# Patient Record
Sex: FEMALE | Race: WHITE | HISPANIC OR LATINO | Employment: OTHER | URBAN - METROPOLITAN AREA
[De-identification: names, ages, dates, MRNs, and addresses within clinical notes are randomized per-mention and may not be internally consistent; named-entity substitution may affect disease eponyms.]

---

## 2023-12-16 ENCOUNTER — APPOINTMENT (OUTPATIENT)
Dept: MRI IMAGING | Facility: CLINIC | Age: 78
End: 2023-12-16
Attending: STUDENT IN AN ORGANIZED HEALTH CARE EDUCATION/TRAINING PROGRAM
Payer: MEDICARE

## 2023-12-16 ENCOUNTER — HOSPITAL ENCOUNTER (OUTPATIENT)
Facility: CLINIC | Age: 78
Setting detail: OBSERVATION
Discharge: HOME | End: 2023-12-17
Attending: STUDENT IN AN ORGANIZED HEALTH CARE EDUCATION/TRAINING PROGRAM | Admitting: HOSPITALIST
Payer: MEDICARE

## 2023-12-16 ENCOUNTER — RESULTS ONLY (OUTPATIENT)
Dept: ADMINISTRATIVE | Facility: CLINIC | Age: 78
End: 2023-12-16

## 2023-12-16 DIAGNOSIS — Z92.89 HISTORY OF CARDIAC MONITORING: Primary | ICD-10-CM

## 2023-12-16 DIAGNOSIS — G45.9 TIA (TRANSIENT ISCHEMIC ATTACK): ICD-10-CM

## 2023-12-16 PROBLEM — I07.1 MILD TRICUSPID REGURGITATION: Status: ACTIVE | Noted: 2020-06-30

## 2023-12-16 PROBLEM — I65.21 CAROTID ARTERY PLAQUE, RIGHT: Status: ACTIVE | Noted: 2020-06-30

## 2023-12-16 LAB
ALBUMIN SERPL BCG-MCNC: 3.9 G/DL (ref 3.5–5.2)
ALP SERPL-CCNC: 79 U/L (ref 40–150)
ALT SERPL W P-5'-P-CCNC: 19 U/L (ref 0–50)
ANION GAP SERPL CALCULATED.3IONS-SCNC: 7 MMOL/L (ref 7–15)
AST SERPL W P-5'-P-CCNC: 24 U/L (ref 0–45)
ATRIAL RATE - MUSE: 52 BPM
ATRIAL RATE - MUSE: 57 BPM
BASOPHILS # BLD AUTO: 0 10E3/UL (ref 0–0.2)
BASOPHILS NFR BLD AUTO: 1 %
BILIRUB SERPL-MCNC: 0.5 MG/DL
BUN SERPL-MCNC: 23.6 MG/DL (ref 8–23)
CALCIUM SERPL-MCNC: 9.1 MG/DL (ref 8.8–10.2)
CHLORIDE SERPL-SCNC: 105 MMOL/L (ref 98–107)
CHOLEST SERPL-MCNC: 157 MG/DL
CREAT SERPL-MCNC: 0.91 MG/DL (ref 0.51–0.95)
DEPRECATED HCO3 PLAS-SCNC: 28 MMOL/L (ref 22–29)
DIASTOLIC BLOOD PRESSURE - MUSE: NORMAL MMHG
DIASTOLIC BLOOD PRESSURE - MUSE: NORMAL MMHG
EGFRCR SERPLBLD CKD-EPI 2021: 63 ML/MIN/1.73M2
EOSINOPHIL # BLD AUTO: 0.3 10E3/UL (ref 0–0.7)
EOSINOPHIL NFR BLD AUTO: 5 %
ERYTHROCYTE [DISTWIDTH] IN BLOOD BY AUTOMATED COUNT: 12.4 % (ref 10–15)
GLUCOSE BLDC GLUCOMTR-MCNC: 79 MG/DL (ref 70–99)
GLUCOSE BLDC GLUCOMTR-MCNC: 95 MG/DL (ref 70–99)
GLUCOSE SERPL-MCNC: 94 MG/DL (ref 70–99)
HBA1C MFR BLD: 5.8 %
HCT VFR BLD AUTO: 39.5 % (ref 35–47)
HDLC SERPL-MCNC: 67 MG/DL
HGB BLD-MCNC: 13.2 G/DL (ref 11.7–15.7)
IMM GRANULOCYTES # BLD: 0 10E3/UL
IMM GRANULOCYTES NFR BLD: 0 %
INR PPP: 1.03 (ref 0.85–1.15)
INTERPRETATION ECG - MUSE: NORMAL
INTERPRETATION ECG - MUSE: NORMAL
LDLC SERPL CALC-MCNC: 79 MG/DL
LYMPHOCYTES # BLD AUTO: 1.3 10E3/UL (ref 0.8–5.3)
LYMPHOCYTES NFR BLD AUTO: 25 %
MCH RBC QN AUTO: 31.8 PG (ref 26.5–33)
MCHC RBC AUTO-ENTMCNC: 33.4 G/DL (ref 31.5–36.5)
MCV RBC AUTO: 95 FL (ref 78–100)
MONOCYTES # BLD AUTO: 0.6 10E3/UL (ref 0–1.3)
MONOCYTES NFR BLD AUTO: 11 %
NEUTROPHILS # BLD AUTO: 3 10E3/UL (ref 1.6–8.3)
NEUTROPHILS NFR BLD AUTO: 58 %
NONHDLC SERPL-MCNC: 90 MG/DL
NRBC # BLD AUTO: 0 10E3/UL
NRBC BLD AUTO-RTO: 0 /100
P AXIS - MUSE: 83 DEGREES
P AXIS - MUSE: 88 DEGREES
PLATELET # BLD AUTO: 141 10E3/UL (ref 150–450)
POTASSIUM SERPL-SCNC: 4.1 MMOL/L (ref 3.4–5.3)
PR INTERVAL - MUSE: 172 MS
PR INTERVAL - MUSE: 174 MS
PROT SERPL-MCNC: 6.9 G/DL (ref 6.4–8.3)
QRS DURATION - MUSE: 74 MS
QRS DURATION - MUSE: 80 MS
QT - MUSE: 474 MS
QT - MUSE: 474 MS
QTC - MUSE: 440 MS
QTC - MUSE: 461 MS
R AXIS - MUSE: -30 DEGREES
R AXIS - MUSE: -33 DEGREES
RBC # BLD AUTO: 4.15 10E6/UL (ref 3.8–5.2)
SODIUM SERPL-SCNC: 140 MMOL/L (ref 135–145)
SYSTOLIC BLOOD PRESSURE - MUSE: NORMAL MMHG
SYSTOLIC BLOOD PRESSURE - MUSE: NORMAL MMHG
T AXIS - MUSE: 26 DEGREES
T AXIS - MUSE: 38 DEGREES
TRIGL SERPL-MCNC: 56 MG/DL
TROPONIN T SERPL HS-MCNC: 12 NG/L
VENTRICULAR RATE- MUSE: 52 BPM
VENTRICULAR RATE- MUSE: 57 BPM
WBC # BLD AUTO: 5.2 10E3/UL (ref 4–11)

## 2023-12-16 PROCEDURE — 70553 MRI BRAIN STEM W/O & W/DYE: CPT

## 2023-12-16 PROCEDURE — 96361 HYDRATE IV INFUSION ADD-ON: CPT

## 2023-12-16 PROCEDURE — 70544 MR ANGIOGRAPHY HEAD W/O DYE: CPT | Mod: XU

## 2023-12-16 PROCEDURE — 120N000001 HC R&B MED SURG/OB

## 2023-12-16 PROCEDURE — 250N000013 HC RX MED GY IP 250 OP 250 PS 637

## 2023-12-16 PROCEDURE — 99222 1ST HOSP IP/OBS MODERATE 55: CPT | Mod: AI | Performed by: INTERNAL MEDICINE

## 2023-12-16 PROCEDURE — 250N000011 HC RX IP 250 OP 636: Performed by: INTERNAL MEDICINE

## 2023-12-16 PROCEDURE — 83036 HEMOGLOBIN GLYCOSYLATED A1C: CPT | Performed by: INTERNAL MEDICINE

## 2023-12-16 PROCEDURE — 96360 HYDRATION IV INFUSION INIT: CPT | Mod: XU

## 2023-12-16 PROCEDURE — 255N000002 HC RX 255 OP 636: Performed by: STUDENT IN AN ORGANIZED HEALTH CARE EDUCATION/TRAINING PROGRAM

## 2023-12-16 PROCEDURE — A9585 GADOBUTROL INJECTION: HCPCS | Performed by: STUDENT IN AN ORGANIZED HEALTH CARE EDUCATION/TRAINING PROGRAM

## 2023-12-16 PROCEDURE — 250N000013 HC RX MED GY IP 250 OP 250 PS 637: Performed by: INTERNAL MEDICINE

## 2023-12-16 PROCEDURE — 85610 PROTHROMBIN TIME: CPT | Performed by: STUDENT IN AN ORGANIZED HEALTH CARE EDUCATION/TRAINING PROGRAM

## 2023-12-16 PROCEDURE — 70549 MR ANGIOGRAPH NECK W/O&W/DYE: CPT

## 2023-12-16 PROCEDURE — 80061 LIPID PANEL: CPT | Performed by: INTERNAL MEDICINE

## 2023-12-16 PROCEDURE — 85025 COMPLETE CBC W/AUTO DIFF WBC: CPT | Performed by: STUDENT IN AN ORGANIZED HEALTH CARE EDUCATION/TRAINING PROGRAM

## 2023-12-16 PROCEDURE — 84484 ASSAY OF TROPONIN QUANT: CPT | Performed by: STUDENT IN AN ORGANIZED HEALTH CARE EDUCATION/TRAINING PROGRAM

## 2023-12-16 PROCEDURE — 93005 ELECTROCARDIOGRAM TRACING: CPT

## 2023-12-16 PROCEDURE — 250N000013 HC RX MED GY IP 250 OP 250 PS 637: Performed by: STUDENT IN AN ORGANIZED HEALTH CARE EDUCATION/TRAINING PROGRAM

## 2023-12-16 PROCEDURE — 82040 ASSAY OF SERUM ALBUMIN: CPT | Performed by: STUDENT IN AN ORGANIZED HEALTH CARE EDUCATION/TRAINING PROGRAM

## 2023-12-16 PROCEDURE — 99285 EMERGENCY DEPT VISIT HI MDM: CPT | Mod: 25

## 2023-12-16 PROCEDURE — 36415 COLL VENOUS BLD VENIPUNCTURE: CPT | Performed by: STUDENT IN AN ORGANIZED HEALTH CARE EDUCATION/TRAINING PROGRAM

## 2023-12-16 RX ORDER — ONDANSETRON 4 MG/1
4 TABLET, ORALLY DISINTEGRATING ORAL EVERY 6 HOURS PRN
Status: DISCONTINUED | OUTPATIENT
Start: 2023-12-16 | End: 2023-12-17 | Stop reason: HOSPADM

## 2023-12-16 RX ORDER — LEVOTHYROXINE SODIUM 75 UG/1
75 TABLET ORAL
Status: DISCONTINUED | OUTPATIENT
Start: 2023-12-17 | End: 2023-12-17 | Stop reason: HOSPADM

## 2023-12-16 RX ORDER — ASPIRIN 325 MG
325 TABLET, DELAYED RELEASE (ENTERIC COATED) ORAL DAILY
Status: DISCONTINUED | OUTPATIENT
Start: 2023-12-17 | End: 2023-12-17

## 2023-12-16 RX ORDER — ATORVASTATIN CALCIUM 40 MG/1
40 TABLET, FILM COATED ORAL
Status: DISCONTINUED | OUTPATIENT
Start: 2023-12-16 | End: 2023-12-17 | Stop reason: HOSPADM

## 2023-12-16 RX ORDER — SODIUM CHLORIDE AND POTASSIUM CHLORIDE 150; 900 MG/100ML; MG/100ML
INJECTION, SOLUTION INTRAVENOUS CONTINUOUS
Status: ACTIVE | OUTPATIENT
Start: 2023-12-16 | End: 2023-12-16

## 2023-12-16 RX ORDER — LEVOTHYROXINE SODIUM 75 UG/1
75 TABLET ORAL DAILY
COMMUNITY
Start: 2022-12-15

## 2023-12-16 RX ORDER — PROCHLORPERAZINE MALEATE 5 MG
5 TABLET ORAL EVERY 6 HOURS PRN
Status: DISCONTINUED | OUTPATIENT
Start: 2023-12-16 | End: 2023-12-17 | Stop reason: HOSPADM

## 2023-12-16 RX ORDER — ATORVASTATIN CALCIUM 10 MG/1
5 TABLET, FILM COATED ORAL EVERY EVENING
Status: ON HOLD | COMMUNITY
Start: 2022-12-15 | End: 2023-12-17

## 2023-12-16 RX ORDER — CHOLECALCIFEROL (VITAMIN D3) 50 MCG
1 TABLET ORAL DAILY
Status: ON HOLD | COMMUNITY
End: 2023-12-16

## 2023-12-16 RX ORDER — VITAMIN B COMPLEX
2 TABLET ORAL DAILY
COMMUNITY

## 2023-12-16 RX ORDER — ZINC GLUCONATE 50 MG
50 TABLET ORAL DAILY
COMMUNITY

## 2023-12-16 RX ORDER — ASPIRIN 81 MG/1
81 TABLET ORAL
Status: ON HOLD | COMMUNITY
End: 2023-12-17

## 2023-12-16 RX ORDER — ASPIRIN 300 MG/1
300 SUPPOSITORY RECTAL DAILY
Status: DISCONTINUED | OUTPATIENT
Start: 2023-12-17 | End: 2023-12-17

## 2023-12-16 RX ORDER — HYDRALAZINE HYDROCHLORIDE 20 MG/ML
10-20 INJECTION INTRAMUSCULAR; INTRAVENOUS
Status: DISCONTINUED | OUTPATIENT
Start: 2023-12-16 | End: 2023-12-17 | Stop reason: HOSPADM

## 2023-12-16 RX ORDER — AMOXICILLIN 500 MG
1200 CAPSULE ORAL DAILY
COMMUNITY

## 2023-12-16 RX ORDER — ASPIRIN 325 MG
325 TABLET ORAL ONCE
Status: COMPLETED | OUTPATIENT
Start: 2023-12-16 | End: 2023-12-16

## 2023-12-16 RX ORDER — PROCHLORPERAZINE 25 MG
12.5 SUPPOSITORY, RECTAL RECTAL EVERY 12 HOURS PRN
Status: DISCONTINUED | OUTPATIENT
Start: 2023-12-16 | End: 2023-12-17 | Stop reason: HOSPADM

## 2023-12-16 RX ORDER — GADOBUTROL 604.72 MG/ML
10 INJECTION INTRAVENOUS ONCE
Status: COMPLETED | OUTPATIENT
Start: 2023-12-16 | End: 2023-12-16

## 2023-12-16 RX ORDER — CLOPIDOGREL BISULFATE 75 MG/1
300 TABLET ORAL ONCE
Status: COMPLETED | OUTPATIENT
Start: 2023-12-16 | End: 2023-12-16

## 2023-12-16 RX ORDER — ASPIRIN 81 MG/1
324 TABLET, CHEWABLE ORAL DAILY
Status: DISCONTINUED | OUTPATIENT
Start: 2023-12-17 | End: 2023-12-17

## 2023-12-16 RX ORDER — ONDANSETRON 2 MG/ML
4 INJECTION INTRAMUSCULAR; INTRAVENOUS EVERY 6 HOURS PRN
Status: DISCONTINUED | OUTPATIENT
Start: 2023-12-16 | End: 2023-12-17 | Stop reason: HOSPADM

## 2023-12-16 RX ORDER — LABETALOL HYDROCHLORIDE 5 MG/ML
10-40 INJECTION, SOLUTION INTRAVENOUS EVERY 10 MIN PRN
Status: DISCONTINUED | OUTPATIENT
Start: 2023-12-16 | End: 2023-12-17 | Stop reason: HOSPADM

## 2023-12-16 RX ORDER — LOSARTAN POTASSIUM 50 MG/1
1 TABLET ORAL DAILY
COMMUNITY
Start: 2023-11-28

## 2023-12-16 RX ORDER — DIAZEPAM 5 MG
5 TABLET ORAL ONCE
Status: COMPLETED | OUTPATIENT
Start: 2023-12-16 | End: 2023-12-16

## 2023-12-16 RX ADMIN — ASPIRIN 325 MG ORAL TABLET 325 MG: 325 PILL ORAL at 15:04

## 2023-12-16 RX ADMIN — CLOPIDOGREL BISULFATE 300 MG: 75 TABLET ORAL at 16:03

## 2023-12-16 RX ADMIN — POTASSIUM CHLORIDE AND SODIUM CHLORIDE: 900; 150 INJECTION, SOLUTION INTRAVENOUS at 15:56

## 2023-12-16 RX ADMIN — GADOBUTROL 10 ML: 604.72 INJECTION INTRAVENOUS at 14:03

## 2023-12-16 RX ADMIN — DIAZEPAM 5 MG: 5 TABLET ORAL at 12:45

## 2023-12-16 RX ADMIN — ATORVASTATIN CALCIUM 40 MG: 40 TABLET, FILM COATED ORAL at 20:44

## 2023-12-16 ASSESSMENT — ACTIVITIES OF DAILY LIVING (ADL)
ADLS_ACUITY_SCORE: 35
CHANGE_IN_FUNCTIONAL_STATUS_SINCE_ONSET_OF_CURRENT_ILLNESS/INJURY: NO
DIFFICULTY_EATING/SWALLOWING: NO
EQUIPMENT_CURRENTLY_USED_AT_HOME: GRAB BAR, TUB/SHOWER;RAISED TOILET SEAT
WEAR_GLASSES_OR_BLIND: YES
DRESSING/BATHING_DIFFICULTY: NO
FALL_HISTORY_WITHIN_LAST_SIX_MONTHS: NO
CONCENTRATING,_REMEMBERING_OR_MAKING_DECISIONS_DIFFICULTY: NO
ADLS_ACUITY_SCORE: 20
HEARING_DIFFICULTY_OR_DEAF: NO
ADLS_ACUITY_SCORE: 20
VISION_MANAGEMENT: GLASSES
DOING_ERRANDS_INDEPENDENTLY_DIFFICULTY: NO
TOILETING_ISSUES: NO
DIFFICULTY_COMMUNICATING: NO
ADLS_ACUITY_SCORE: 20
WALKING_OR_CLIMBING_STAIRS_DIFFICULTY: NO

## 2023-12-16 NOTE — ED PROVIDER NOTES
"  History     Chief Complaint:  Stroke Symptoms       HPI   Belinda Castillo is a 78 year old female with a history of hypertension and hypothyroidism who presents with difficulty speaking which occurred at 1000 today which lasted 15 minutes. Her speech was slurred and her  noted a facial droop around right lip. Denies fever, chest pain, shortness of breath, vision changes, or headache. She is on statin for high blood pressure and daily baby aspirin. Denies history of diabetes, smoking, or TIA. She reports being claustrophobic and would like something to calm down during MRI.     Independent Historian:    None - Patient Only    Review of External Notes:  Blood pressure office visit on 5/16     Allergies:  Sulfa Antibiotics     Physical Exam   Patient Vitals for the past 24 hrs:   BP Temp Temp src Pulse Resp SpO2 Height Weight   12/16/23 1419 (!) 145/127 -- -- -- -- 100 % -- --   12/16/23 1234 (!) 142/62 -- -- -- -- -- -- --   12/16/23 1219 (!) 145/108 -- -- 53 -- 99 % -- --   12/16/23 1155 (!) 158/55 97.8  F (36.6  C) Temporal 52 18 97 % 1.727 m (5' 8\") 80.7 kg (178 lb)     Physical Exam  GENERAL: Patient well-appearing  HEAD: Atraumatic.  EYES: Anicteric. Right eyebrow droops which is normal for her.   NOSE: No active bleeding  MOUTH: Moist mucosa  THROAT: Patent airway.   NECK: No rigidity  CV: RRR, no murmurs rubs or gallops  PULM: CTAB with good aeration; no retractions, rales, rhonchi, or wheezing  ABD: Soft, nontender, nondistended, no guarding, no peritoneal signs   DERM: No rash. Skin warm and dry  EXTREMITY: Moving all extremities without difficulty. No calf tenderness or peripheral edema  VASCULAR: Symmetric pulses bilaterally  NEURO: A,Ox3. CN 2-12 fully intact. Strength 5/5 bilateral LE/UE. Sensation fully intact to light touch symmetrically bilateral LE/UE. Normal finger-to-nose and heel to shin. No ataxia.     Emergency Department Course   ECG  ECG results from 12/16/23   EKG 12-lead, tracing " only     Value    Systolic Blood Pressure     Diastolic Blood Pressure     Ventricular Rate 57    Atrial Rate 57    ME Interval 174    QRS Duration 74        QTc 461    P Axis 88    R AXIS -30    T Axis 38    Interpretation ECG      Sinus bradycardia with occasional Premature ventricular complexes  Left axis deviation  Abnormal ECG  No previous ECGs available  Taken 1229, Read 1241     Laboratory: Imaging:   Labs Ordered and Resulted from Time of ED Arrival to Time of ED Departure   COMPREHENSIVE METABOLIC PANEL - Abnormal       Result Value    Sodium 140      Potassium 4.1      Carbon Dioxide (CO2) 28      Anion Gap 7      Urea Nitrogen 23.6 (*)     Creatinine 0.91      GFR Estimate 63      Calcium 9.1      Chloride 105      Glucose 94      Alkaline Phosphatase 79      AST 24      ALT 19      Protein Total 6.9      Albumin 3.9      Bilirubin Total 0.5     CBC WITH PLATELETS AND DIFFERENTIAL - Abnormal    WBC Count 5.2      RBC Count 4.15      Hemoglobin 13.2      Hematocrit 39.5      MCV 95      MCH 31.8      MCHC 33.4      RDW 12.4      Platelet Count 141 (*)     % Neutrophils 58      % Lymphocytes 25      % Monocytes 11      % Eosinophils 5      % Basophils 1      % Immature Granulocytes 0      NRBCs per 100 WBC 0      Absolute Neutrophils 3.0      Absolute Lymphocytes 1.3      Absolute Monocytes 0.6      Absolute Eosinophils 0.3      Absolute Basophils 0.0      Absolute Immature Granulocytes 0.0      Absolute NRBCs 0.0     INR - Normal    INR 1.03     TROPONIN T, HIGH SENSITIVITY - Normal    Troponin T, High Sensitivity 12       MRA Brain ([place] [facility] [place]) wo Contrast   Final Result   IMPRESSION:   HEAD MRI:    1.  No acute or subacute ischemic change.   2.  No acute intracranial process.   3.  Generalized brain atrophy and presumed microvascular ischemic changes as detailed above.      HEAD MRA:    1.  No aneurysm, high flow AVM or significant stenosis identified.      NECK MRA:   1.  No hemodynamically  significant stenosis in the vessels of the neck.      MR Brain w/o & w Contrast   Final Result   IMPRESSION:   HEAD MRI:    1.  No acute or subacute ischemic change.   2.  No acute intracranial process.   3.  Generalized brain atrophy and presumed microvascular ischemic changes as detailed above.      HEAD MRA:    1.  No aneurysm, high flow AVM or significant stenosis identified.      NECK MRA:   1.  No hemodynamically significant stenosis in the vessels of the neck.      MRA Neck (Carotids) wo & w Contrast   Final Result   IMPRESSION:   HEAD MRI:    1.  No acute or subacute ischemic change.   2.  No acute intracranial process.   3.  Generalized brain atrophy and presumed microvascular ischemic changes as detailed above.      HEAD MRA:    1.  No aneurysm, high flow AVM or significant stenosis identified.      NECK MRA:   1.  No hemodynamically significant stenosis in the vessels of the neck.      Echocardiogram Complete    (Results Pending)           Emergency Department Course & Assessments:    Interventions:  Medications   levothyroxine (SYNTHROID/LEVOTHROID) tablet 75 mcg (has no administration in time range)   0.9% sodium chloride + KCl 20 mEq/L infusion (has no administration in time range)   aspirin (ASA) EC tablet 325 mg (has no administration in time range)     Or   aspirin (ASA) chewable tablet 324 mg (has no administration in time range)     Or   aspirin (ASA) Suppository 300 mg (has no administration in time range)   labetalol (NORMODYNE/TRANDATE) injection 10-40 mg (has no administration in time range)   hydrALAZINE (APRESOLINE) injection 10-20 mg (has no administration in time range)   atorvastatin (LIPITOR) tablet 40 mg (has no administration in time range)   ondansetron (ZOFRAN ODT) ODT tab 4 mg (has no administration in time range)     Or   ondansetron (ZOFRAN) injection 4 mg (has no administration in time range)   prochlorperazine (COMPAZINE) injection 5 mg (has no administration in time range)      Or   prochlorperazine (COMPAZINE) tablet 5 mg (has no administration in time range)     Or   prochlorperazine (COMPAZINE) suppository 12.5 mg (has no administration in time range)   aspirin (ASA) tablet 325 mg (has no administration in time range)   diazepam (VALIUM) tablet 5 mg (5 mg Oral $Given 12/16/23 1245)   gadobutrol (GADAVIST) injection 10 mL (10 mLs Intravenous $Given 12/16/23 1403)        Assessments, Independent Interpretation, Consult/Discussion of ManagementTests:  1215 I obtained the history and examined the patient as detailed above.   1223 I spoke with stroke neurologist, Dr. Dobbs, about patient presentation.  1314 I spoke with the hospitalist, Dr. Reyes, about patient admission.     Social Determinants of Health affecting care:  None    Disposition:  The patient was admitted to the hospital under the care of Dr. Reyes.     Impression & Plan      Medical Decision Making:  Symptoms most suggestive of TIA.   Chronic conditions complicating -hypertension.  DDx considered CVA, carotid artery injury, arrhythmia, seizure, trauma or intracranial lesion, however evaluation not consistent with these etiologies.  N. I H. stroke scale zero.   Labs unremarkable.   Discussed with Neurology   Will hold further aspirin pending MRI reading.  MRI brain and MRA head and neck unremarkable.  Discussed with hospitalist   Patient admitted.       Diagnosis:    ICD-10-CM    1. TIA (transient ischemic attack)  G45.9            Discharge Medications:  Current Discharge Medication List        Scribe Disclosure:  Mayank MARIA, am serving as a scribe at 12:21 PM on 12/16/2023 to document services personally performed by Al Jensen MD based on my observations and the provider's statements to me.   12/16/2023   Al Jensen MD Foss, Kevin, MD  12/16/23 9395

## 2023-12-16 NOTE — CONSULTS
[facility]    Stroke Consult Note    Reason for Consult:  TIA    Chief Complaint: Stroke Symptoms       HPI  Belinda Castillo is a 78 year old female with h/o of HTN, HLD presents to the ED after she had an episode of inability to speak and right facial droop lasting for about 10 minutes.She states that she had this episode around 10 am on 12/16/23 and denied having another symptoms such as weakness, numbness, double vision, imbalance.    TIA Evaluation Summarized    MRI and/or Head CT No acute or subacute ischemic change    Intracranial Vasculature No aneurysm, high flow AVM or significant stenosis identified.    Cervical Vasculature  No hemodynamically significant stenosis in the vessels of the neck.      Echocardiogram pending   EKG/Telemetry Sinus bradycardia with occasional Premature ventricular complexes    Other Testing Not Applicable      LDL No lab value available in past 30 days   A1C No lab value available in past 90 days       ABCD2 Patients Score   Age ? 60 years 1 point 1   Blood Pressure    SBP ? 140 or DBP ?  90    1 point 1   Clinical Features    - Unilateral weakness    - Speech disturbance w/o weakness    - Other    2 points  1 point    0 points 1   Duration of symptoms    ? 60 minutes    10-59 minutes    < 10 minutes   2 points  1 point  0 points 1   Diabetes  1 point 0   Patient s ABCD2 Score (0-7) = 4       Impression  Transient ischemic attack      Recommendations   - Plavix 300 mg once   - ASA 81 mg and Plavix 75 mg daily for 21 days and then ASA 81 mg only  - Neurochecks and Vital Signs every q4h   - Statin: Lipitor 40 mg daily  - 24-hour Telemetry  - Bedside Glucose Monitoring  - A1c, Lipid Panel  - PT/OT/SLP  - Stroke Education  - Euthermia, Euglycemia  - Echo     Patient Follow-up    - final recommendation pending work-up    Thank you for this consult. We will continue to follow.     The Stroke Staff is Dr. Avilez.    Natasha Dobbs MD  Vascular  "Neurology Fellow    To page me or covering stroke neurology team member, click here: [facility]  Choose \"On Call\" tab at top, then select \"NEUROLOGY/ALL SITES\" from middle drop-down box, press Enter, then look for \"stroke\" or \"telestroke\" for your site.  _____________________________________________________    Clinically Significant Risk Factors Present on Admission                  # Hypertension: Noted on problem list      # Overweight: Estimated body mass index is 25.98 kg/m  as calculated from the following:    Height as of this encounter: 1.727 m (5' 8\").    Weight as of this encounter: 77.5 kg (170 lb 13.7 oz).                 Past Medical History    Past Medical History:   Diagnosis Date    ALLERGIC RHINITIS     Allergy, unspecified not elsewhere classified     SEPTRA    CYSTITIS 04/01/1991    Essential hypertension 11/01/2007    Formatting of this note might be different from the original. Diagnosed 2010: amlodipine 5mg. 7/2013: BP controlled still with 2.5mg.    Essential tremor 09/17/2015    Formatting of this note might be different from the original. Chronic. Patient reports her Mother has this also. Declines Neurologic evaluation.    HYPOTHYROIDISM 10/18/1994    Hypothyroidism (acquired) 04/30/2007    Formatting of this note might be different from the original. Most recent TSH normal (9/2015). Diagnosed with Graves Disease around 1998.  Previously Endocrine (not sure which doctor). Initially hyperthyroidism, treated medically, then hypothyroidism.    OTITIS MEDIA     RIGHT    Palpitations     Pure hypercholesterolemia 07/11/2013    Formatting of this note might be different from the original. 9/2015: FLP normal. Considering statin versus [facility] due to FHx. 7/2014: Total 198, TG 61, HDL 66, . Considering statin (FHx); patient declined.    Sterilization     Thyroid nodule 09/23/2014    Formatting of this note might be different from the original. Noted on MRI/MRA 9/2014; ultrasound " confirmed 1.4cm nodule.  Patient preferred to biopsy (instead of seeing Endocrinology first to review.) Biopsy with benign colloid nodule with cystic degeneration.  Curbsided Endocrine: If FNA fine, TSH with ultrasound in one year; if stable; then 2 years, then Q3-5 years. 9/2015: ultrasound stable. 2     Medications   Home Meds  Prior to Admission medications    Medication Sig Start Date End Date Taking? Authorizing Provider   atorvastatin (LIPITOR) 10 MG tablet Take 10 mg by mouth daily 12/15/22   Reported, Patient   levothyroxine (SYNTHROID) 75 MCG tablet Take 75 mcg by mouth daily 12/15/22   Reported, Patient   losartan (COZAAR) 50 MG tablet Take 1 tablet by mouth daily 11/28/23   Reported, Patient       Scheduled Meds   aspirin  325 mg Oral Daily    Or    aspirin  324 mg Oral or NG Tube Daily    Or    aspirin  300 mg Rectal Daily    aspirin  325 mg Oral Once    atorvastatin  40 mg Oral or NG Tube Daily at 8 pm    [START ON 12/17/2023] levothyroxine  75 mcg Oral QAM AC       Infusion Meds   0.9% sodium chloride + KCl 20 mEq/L         Allergies   Allergies   Allergen Reactions    Sulfa Antibiotics Dizziness     Vertigo           PHYSICAL EXAMINATION   Temp:  [97.8  F (36.6  C)] 97.8  F (36.6  C)  Pulse:  [52-53] 53  Resp:  [16-18] 16  BP: (142-158)/() 146/68  SpO2:  [97 %-100 %] 98 %    Neurologic  Mental Status:  alert, oriented x 3, follows commands, speech clear and fluent, naming and repetition normal  Cranial Nerves:  visual fields intact, PERRL, EOMI with normal smooth pursuit, facial sensation intact and symmetric, facial movements symmetric, hearing not formally tested but intact to conversation, palate elevation symmetric and uvula midline, no dysarthria, shoulder shrug strong bilaterally, tongue protrusion midline  Motor:  normal muscle tone and bulk, no abnormal movements, able to move all limbs spontaneously, strength 5/5 throughout upper and lower extremities, no pronator drift  Reflexes:  toes  down-going  Sensory:  light touch sensation intact and symmetric throughout upper and lower extremities, no extinction on double simultaneous stimulation   Coordination:  normal finger-to-nose and heel-to-shin bilaterally without dysmetria, rapid alternating movements symmetric  Station/Gait:  deferred    Stroke Scales    NIHSS  1a. Level of Consciousness 0-->Alert, keenly responsive   1b. LOC Questions 0-->Answers both questions correctly   1c. LOC Commands 0-->Performs both tasks correctly   2.   Best Gaze 0-->Normal   3.   Visual 0-->No visual loss   4.   Facial Palsy 0-->Normal symmetrical movements   5a. Motor Arm, Left 0-->No drift, limb holds 90 (or 45) degrees for full 10 secs   5b. Motor Arm, Right 0-->No drift, limb holds 90 (or 45) degrees for full 10 secs   6a. Motor Leg, Left 0-->No drift, leg holds 30 degree position for full 5 secs   6b. Motor Leg, right 0-->No drift, leg holds 30 degree position for full 5 secs   7.   Limb Ataxia 0-->Absent   8.   Sensory 0-->Normal, no sensory loss   9.   Best Language 0-->No aphasia, normal   10. Dysarthria 0-->Normal   11. Extinction and Inattention  0-->No abnormality   Total 0 (12/16/23 1456)       Imaging  I personally reviewed all imaging; relevant findings per HPI.    Labs Data   CBC  Recent Labs   Lab 12/16/23  1203   WBC 5.2   RBC 4.15   HGB 13.2   HCT 39.5   *     Basic Metabolic Panel   Recent Labs   Lab 12/16/23  1203      POTASSIUM 4.1   CHLORIDE 105   CO2 28   BUN 23.6*   CR 0.91   GLC 94   POLLY 9.1     Liver Panel  Recent Labs   Lab 12/16/23  1203   PROTTOTAL 6.9   ALBUMIN 3.9   BILITOTAL 0.5   ALKPHOS 79   AST 24   ALT 19     INR    Recent Labs   Lab Test 12/16/23  1203   INR 1.03           Stroke Consult Data Data   This was a non-emergent, non-telestroke consult.

## 2023-12-16 NOTE — ED NOTES
"[facility]  ED Nurse Handoff Report    ED Chief complaint: Stroke Symptoms      ED Diagnosis:   Final diagnoses:   TIA (transient ischemic attack)       Code Status: not addressed     Allergies:   Allergies   Allergen Reactions    Sulfa Antibiotics Dizziness     Vertigo        Patient Story:     Pt states her  informed her that around 10 am this morning she walked into a room and had a moment where she was having difficulty finding the words she wanted,  states he noted her  left sided mouth droop, and  pt had blank stare for few minutes.     Focused Assessment:  sx resolved PTA     Treatments and/or interventions provided:   Patient's response to treatments and/or interventions:     To be done/followed up on inpatient unit:  unknown     Does this patient have any cognitive concerns?:  none     Activity level - Baseline/Home:  Independent  Activity Level - Current:   Independent    Patient's Preferred language: Data Unavailable   Needed?: No    Isolation: None  Infection: Not Applicable  Patient tested for COVID 19 prior to admission: NO  Bariatric?: No    Vital Signs:   Vitals:    12/16/23 1155   BP: (!) 158/55   BP Location: Right arm   Patient Position: Chair   Cuff Size: Adult Large   Pulse: 52   Resp: 18   Temp: 97.8  F (36.6  C)   TempSrc: Temporal   SpO2: 97%   Weight: 80.7 kg (178 lb)   Height: 1.727 m (5' 8\")       Cardiac Rhythm:     Was the PSS-3 completed:   Yes  What interventions are required if any?               Family Comments:   OBS brochure/video discussed/provided to patient/family:               Name of person given brochure if not patient:               Relationship to patient:     For the majority of the shift this patient's behavior was .   Behavioral interventions performed were .    ED NURSE PHONE NUMBER: [phone]        "

## 2023-12-16 NOTE — H&P
[facility]    History and Physical  Hospitalist       Date of Admission:  12/16/2023    Assessment & Plan   This is a 78-year-old female with history of hypertension, hyperlipidemia, essential tremor, hypothyroidism, come to the ER with complaint of unable to speak, right facial droop lasted for 10 minutes.    Possible TIA  This is a 78-year-old female with history of hypertension, hyperlipidemia, strong family history of stroke and TIA presented with speech difficulty and right-sided facial droop lasted for 10 to 15 minutes and now completely resolved.  She take aspirin 81 mg 3 times a week only.  At this time her workup is completely negative, including CBC, BMP, LFTs.  EKG shows sinus bradycardia.  MRI of the brain with and without contrast MRA of the head and neck unremarkable.  At this time we will admitted for possible TIA, I will give her aspirin 325 mg times once and continue with that.  I will increase the dose of Lipitor to 40 mg daily.  Will keep on telemetry  PT and OT consult, stroke neurology consult to evaluate the patient.  Will do echocardiogram, neurocheck 4 hours.  If she remains stable, possible discharge tomorrow.  Show she will benefit from 30 days event monitor to rule out underlying A-fib.    Hypertension  Hyperlipidemia  She is on losartan 50 mg daily and Lipitor 10 mg daily, I will continue losartan and increase the dose of Lipitor to 40 mg daily.    Hypothyroidism  Resume levothyroxine    Essential tremors  Well-controlled at this time, does not take any medication for that.    DVT Prophylaxis: Pneumatic Compression Devices  Code Status: Full Code    Disposition: Expected discharge in 1 day once remain stable.    Jose Reyes MD, MD    Primary Care Physician   No primary care provider on file.    Chief Complaint   Word finding difficulty, facial droop    History is obtained from the patient    History of Present Illness   This is a 78-year-old female with history  of hypertension, hyperlipidemia, essential tremor, hypothyroidism, come to the ER with complaint of unable to speak, right facial droop lasted for 10 minutes    According to the patient she was doing fine this morning, but all of a sudden she has difficulty in speaking, she have the words but and know what to say but not coming out of their mouth, the  also noticed some facial droop on the right side of her mouth as well, this symptom lasted for about 10 to 15 minutes and resolved.  Patient has very strong family history of TIAs and stroke in the family mother has TIA and father had a stroke, so they decided come to the ER.  By the time they come to the ER her symptoms were already resolved, patient denies any headache dizziness lightheadedness fever chills cough no chest pain shortness of breath orthopnea PND palpitation no dysuria hematuria constipation diarrhea, no numbness tingling or weakness on either limbs, she has no difficulty in walking either.    Rest of the review of system is otherwise negative.    Past Medical History    I have reviewed this patient's medical history and updated it with pertinent information if needed.   Past Medical History:   Diagnosis Date    ALLERGIC RHINITIS     Allergy, unspecified not elsewhere classified     SEPTRA    CYSTITIS 04/01/1991    Essential hypertension 11/01/2007    Formatting of this note might be different from the original. Diagnosed 2010: amlodipine 5mg. 7/2013: BP controlled still with 2.5mg.    Essential tremor 09/17/2015    Formatting of this note might be different from the original. Chronic. Patient reports her Mother has this also. Declines Neurologic evaluation.    HYPOTHYROIDISM 10/18/1994    Hypothyroidism (acquired) 04/30/2007    Formatting of this note might be different from the original. Most recent TSH normal (9/2015). Diagnosed with Graves Disease around 1998.  Previously Endocrine (not sure which doctor). Initially hyperthyroidism, treated  medically, then hypothyroidism.    OTITIS MEDIA     RIGHT    Palpitations     Pure hypercholesterolemia 07/11/2013    Formatting of this note might be different from the original. 9/2015: FLP normal. Considering statin versus [facility] due to FHx. 7/2014: Total 198, TG 61, HDL 66, . Considering statin (FHx); patient declined.    Sterilization     Thyroid nodule 09/23/2014    Formatting of this note might be different from the original. Noted on MRI/MRA 9/2014; ultrasound confirmed 1.4cm nodule.  Patient preferred to biopsy (instead of seeing Endocrinology first to review.) Biopsy with benign colloid nodule with cystic degeneration.  Curbsided Endocrine: If FNA fine, TSH with ultrasound in one year; if stable; then 2 years, then Q3-5 years. 9/2015: ultrasound stable. 2       Past Surgical History   I have reviewed this patient's surgical history and updated it with pertinent information if needed.  Past Surgical History:   Procedure Laterality Date    [facility] NONSPECIFIC PROCEDURE  08/99    FLEX SIG    [facility] NONSPECIFIC PROCEDURE  1977    TUBAL LIGATION    [facility] NONSPECIFIC PROCEDURE  06/12/91    HYSTEROSCOPY    [facility] NONSPECIFIC PROCEDURE  04/91    EPIDURIM BX       Prior to Admission Medications   Prior to Admission Medications   Prescriptions Last Dose Informant Patient Reported? Taking?   atorvastatin (LIPITOR) 10 MG tablet   Yes Yes   Sig: Take 10 mg by mouth daily   levothyroxine (SYNTHROID) 75 MCG tablet   Yes Yes   Sig: Take 75 mcg by mouth   losartan (COZAAR) 50 MG tablet   Yes Yes   Sig: Take 1 tablet by mouth daily      Facility-Administered Medications: None     Allergies   Allergies   Allergen Reactions    Sulfa Antibiotics Dizziness     Vertigo        Social History   I have reviewed this patient's social history and updated it with pertinent information if needed. Belinda Castillo      Family History   I have reviewed this patient's family history and updated it with pertinent information if  needed.   History reviewed. No pertinent family history.    Review of Systems   CONSTITUTIONAL:  negative  EYES:  negative  HEENT:  negative  RESPIRATORY:  negative  CARDIOVASCULAR:  negative  GASTROINTESTINAL:  negative  GENITOURINARY:  negative  HEMATOLOGIC/LYMPHATIC:  negative  ALLERGIC/IMMUNOLOGIC:  negative  ENDOCRINE:  negative  MUSCULOSKELETAL:  negative  NEUROLOGICAL:  negative  BEHAVIOR/PSYCH:  negative    Physical Exam   Temp: 97.8  F (36.6  C) Temp src: Temporal BP: (!) 158/55 Pulse: 52   Resp: 18 SpO2: 97 % O2 Device: None (Room air)    Vital Signs with Ranges  Temp:  [97.8  F (36.6  C)] 97.8  F (36.6  C)  Pulse:  [52] 52  Resp:  [18] 18  BP: (158)/(55) 158/55  SpO2:  [97 %] 97 %  178 lbs 0 oz    Constitutional: Awake, alert, cooperative, no apparent distress.  Eyes: Conjunctiva and pupils examined and normal.  HEENT: Moist mucous membranes, normal dentition.  Respiratory: Clear to auscultation bilaterally, no crackles or wheezing.  Cardiovascular: Regular rate and rhythm, normal S1 and S2, and no murmur noted.  GI: Soft, non-distended, non-tender, normal bowel sounds.  Lymph/Hematologic: No anterior cervical or supraclavicular adenopathy.  Skin: No rashes, no cyanosis, no edema.  Musculoskeletal: No joint swelling, erythema or tenderness.  Neurologic: Cranial nerves 2-12 intact, normal strength and sensation.  Psychiatric: Alert, oriented to person, place and time, no obvious anxiety or depression.    Data   Data reviewed today:  I personally reviewed the EKG tracing showing sinus bradycardia, no acute ischemic changes and the brain MRI image(s) reviewed myself, and agree with the report below.  Recent Labs   Lab 12/16/23  1203   WBC 5.2   HGB 13.2   MCV 95   *      POTASSIUM 4.1   CHLORIDE 105   CO2 28   BUN 23.6*   CR 0.91   ANIONGAP 7   POLLY 9.1   GLC 94   ALBUMIN 3.9   PROTTOTAL 6.9   BILITOTAL 0.5   ALKPHOS 79   ALT 19   AST 24       No results found for this or any previous visit (from  the past 24 hour(s)).

## 2023-12-16 NOTE — PHARMACY-ADMISSION MEDICATION HISTORY
Pharmacist Admission Medication History    Admission medication history is complete. The information provided in this note is only as accurate as the sources available at the time of the update.    Information Source(s): Patient and CareEverywhere/SureScripts via in-person    Pertinent Information: none    Changes made to PTA medication list:  Added: OTCs  Deleted: None  Changed: Atorvastatin, Levothyroxine    Allergies reviewed with patient and updates made in EHR: yes    Medication History Completed By: Jose R Kellogg RPH 12/16/2023 3:55 PM    PTA Med List   Medication Sig Last Dose    aspirin 81 MG EC tablet Take 81 mg by mouth three times a week Takes on Mondays, Wednesdays, and Fridays 12/15/2023    atorvastatin (LIPITOR) 10 MG tablet Take 5 mg by mouth every evening 1/2 x 10mg = 5mg 12/15/2023    levothyroxine (SYNTHROID) 75 MCG tablet Take 75 mcg by mouth daily Brand name only 12/16/2023    losartan (COZAAR) 50 MG tablet Take 1 tablet by mouth daily Takes at noon 12/15/2023    Omega-3 Fatty Acids (FISH OIL) 1200 MG capsule Take 1,200 mg by mouth daily Takes at noon 12/15/2023 at 1200    Vitamin D3 (VITAMIN D, CHOLECALCIFEROL,) 25 mcg (1000 units) tablet Take 2 tablets by mouth daily Takes at noon 12/15/2023 at 1200    zinc gluconate 50 MG tablet Take 50 mg by mouth daily Takes at noon 12/15/2023 at 1200

## 2023-12-16 NOTE — ED TRIAGE NOTES
Pt states her  informed her that around 10 am this morning she walked into a room and had a moment where she was having difficulty finding the words she wanted,  states he noted her  left sided mouth droop, and  pt had blank stare for few minutes.

## 2023-12-16 NOTE — PROGRESS NOTES
RECEIVING UNIT ED HANDOFF REVIEW    ED Nurse Handoff Report was reviewed by: Rosita Henson RN on December 16, 2023 at 2:27 PM

## 2023-12-17 ENCOUNTER — APPOINTMENT (OUTPATIENT)
Dept: CARDIOLOGY | Facility: CLINIC | Age: 78
End: 2023-12-17
Attending: HOSPITALIST
Payer: MEDICARE

## 2023-12-17 ENCOUNTER — APPOINTMENT (OUTPATIENT)
Dept: CARDIOLOGY | Facility: CLINIC | Age: 78
End: 2023-12-17
Attending: INTERNAL MEDICINE
Payer: MEDICARE

## 2023-12-17 ENCOUNTER — APPOINTMENT (OUTPATIENT)
Dept: PHYSICAL THERAPY | Facility: CLINIC | Age: 78
End: 2023-12-17
Attending: INTERNAL MEDICINE
Payer: MEDICARE

## 2023-12-17 VITALS
DIASTOLIC BLOOD PRESSURE: 63 MMHG | WEIGHT: 170.86 LBS | BODY MASS INDEX: 25.89 KG/M2 | TEMPERATURE: 97.5 F | HEIGHT: 68 IN | RESPIRATION RATE: 16 BRPM | HEART RATE: 53 BPM | OXYGEN SATURATION: 99 % | SYSTOLIC BLOOD PRESSURE: 155 MMHG

## 2023-12-17 PROBLEM — Z92.89 HISTORY OF CARDIAC MONITORING: Status: ACTIVE | Noted: 2023-12-17

## 2023-12-17 LAB
GLUCOSE BLDC GLUCOMTR-MCNC: 82 MG/DL (ref 70–99)
GLUCOSE BLDC GLUCOMTR-MCNC: 90 MG/DL (ref 70–99)
LVEF ECHO: NORMAL

## 2023-12-17 PROCEDURE — 93306 TTE W/DOPPLER COMPLETE: CPT | Mod: 26 | Performed by: INTERNAL MEDICINE

## 2023-12-17 PROCEDURE — 93272 ECG/REVIEW INTERPRET ONLY: CPT | Performed by: INTERNAL MEDICINE

## 2023-12-17 PROCEDURE — G0378 HOSPITAL OBSERVATION PER HR: HCPCS

## 2023-12-17 PROCEDURE — 999N000208 ECHOCARDIOGRAM COMPLETE

## 2023-12-17 PROCEDURE — 93270 REMOTE 30 DAY ECG REV/REPORT: CPT

## 2023-12-17 PROCEDURE — 97110 THERAPEUTIC EXERCISES: CPT | Mod: GP

## 2023-12-17 PROCEDURE — 96361 HYDRATE IV INFUSION ADD-ON: CPT

## 2023-12-17 PROCEDURE — 250N000013 HC RX MED GY IP 250 OP 250 PS 637: Performed by: HOSPITALIST

## 2023-12-17 PROCEDURE — 97116 GAIT TRAINING THERAPY: CPT | Mod: GP

## 2023-12-17 PROCEDURE — 97161 PT EVAL LOW COMPLEX 20 MIN: CPT | Mod: GP

## 2023-12-17 PROCEDURE — 99222 1ST HOSP IP/OBS MODERATE 55: CPT | Mod: GC | Performed by: PSYCHIATRY & NEUROLOGY

## 2023-12-17 PROCEDURE — 250N000011 HC RX IP 250 OP 636: Performed by: INTERNAL MEDICINE

## 2023-12-17 PROCEDURE — 999N000111 HC STATISTIC OT IP EVAL DEFER: Performed by: OCCUPATIONAL THERAPIST

## 2023-12-17 PROCEDURE — 250N000013 HC RX MED GY IP 250 OP 250 PS 637: Performed by: INTERNAL MEDICINE

## 2023-12-17 PROCEDURE — 99239 HOSP IP/OBS DSCHRG MGMT >30: CPT | Performed by: HOSPITALIST

## 2023-12-17 RX ORDER — ASPIRIN 81 MG/1
81 TABLET ORAL DAILY
Qty: 30 TABLET | Refills: 0 | Status: SHIPPED | OUTPATIENT
Start: 2023-12-18 | End: 2023-12-22 | Stop reason: ALTCHOICE

## 2023-12-17 RX ORDER — CLOPIDOGREL BISULFATE 75 MG/1
75 TABLET ORAL DAILY
Status: DISCONTINUED | OUTPATIENT
Start: 2023-12-17 | End: 2023-12-17 | Stop reason: HOSPADM

## 2023-12-17 RX ORDER — ASPIRIN 81 MG/1
81 TABLET ORAL DAILY
Status: DISCONTINUED | OUTPATIENT
Start: 2023-12-17 | End: 2023-12-17 | Stop reason: HOSPADM

## 2023-12-17 RX ORDER — CLOPIDOGREL BISULFATE 75 MG/1
75 TABLET ORAL DAILY
Qty: 20 TABLET | Refills: 0 | Status: SHIPPED | OUTPATIENT
Start: 2023-12-18 | End: 2023-12-22 | Stop reason: ALTCHOICE

## 2023-12-17 RX ORDER — ATORVASTATIN CALCIUM 10 MG/1
10 TABLET, FILM COATED ORAL EVERY EVENING
Start: 2023-12-17

## 2023-12-17 RX ADMIN — LEVOTHYROXINE SODIUM 75 MCG: 75 TABLET ORAL at 06:32

## 2023-12-17 RX ADMIN — POTASSIUM CHLORIDE AND SODIUM CHLORIDE: 900; 150 INJECTION, SOLUTION INTRAVENOUS at 02:08

## 2023-12-17 RX ADMIN — CLOPIDOGREL BISULFATE 75 MG: 75 TABLET ORAL at 08:14

## 2023-12-17 RX ADMIN — ASPIRIN 81 MG: 81 TABLET, COATED ORAL at 08:14

## 2023-12-17 ASSESSMENT — ACTIVITIES OF DAILY LIVING (ADL)
ADLS_ACUITY_SCORE: 20

## 2023-12-17 NOTE — DISCHARGE INSTRUCTIONS
Stroke Education Note    The following information was reviewed with patient:    [x] Activation of emergency medical system (when to call 911)    [x] Individualized risk factors for stroke:  high blood pressure     [x] Warning signs and symptoms of stroke:   B = Balance loss   E = Eyesight changes   F = Facial droop or numbness   A = Arm or leg weakness   S = Speech difficulty, slurred speech   T = Time to call 911 for help     Learner's response to education: Desire to change Ability to change Committment to change     [x] Written stroke educational materials given:   Understanding Stroke Handbook     Dorothy Dorantes, RN

## 2023-12-17 NOTE — DISCHARGE SUMMARY
"[facility]  Hospitalist Discharge Summary      Date of Admission:  12/16/2023  Date of Discharge:  12/17/2023  Discharging Provider: Raul Barger MD  Discharge Service: Hospitalist Service    Discharge Diagnoses     Please refer to the hospital course    Clinically Significant Risk Factors     # Overweight: Estimated body mass index is 25.98 kg/m  as calculated from the following:    Height as of this encounter: 1.727 m (5' 8\").    Weight as of this encounter: 77.5 kg (170 lb 13.7 oz).       Follow-ups Needed After Discharge   Follow-up Appointments     Follow-up and recommended labs and tests       Follow up with primary care provider,as scheduled next week             Unresulted Labs Ordered in the Past 30 Days of this Admission       No orders found for last 31 day(s).        These results will be followed up by none    Discharge Disposition   Discharged to home  Condition at discharge: Stable    Hospital Course     Belinda Castillo is a 78-year-old female with history of hypertension, hyperlipidemia, essential tremor, hypothyroidism, come to the ER with complaint of inability to speak and right facial droop that lasted for 10 minutes.     Suspected TIA  Patient with hypertension, hyperlipidemia, strong family history of stroke and TIA presented with speech difficulty and right-sided facial droop lasted for 10 to 15 minutes.  Had completely resolved by the time she presented to ER.  Takes aspirin 81 mg 3 times a week only.  Workup including CBC, BMP, LFTs unremarkable.  EKG showed sinus bradycardia.  -MRI of the brain with and without contrast MRA of the head and neck unremarkable.  -Stroke neurology consulted, Plavix load 300 mg and aspirin 325 mg was given.   - Recommended Plavix 75 mg daily for 3 weeks and aspirin 81 mg daily indefinitely.  -LDL 79.  Patient on Lipitor 5 Mg daily, did not tolerate higher dose due to muscle aches, discharging on 10 mg daily, discussed with " patient to monitor for symptoms and if worsening symptoms, reduce dose to 5 mg and to follow-up with PCP.  -Patient will be seen by PT OT today and TTE is pending.  As long as unremarkable TTE and cleared by therapy, patient will be discharged home  -No recurrence of symptoms while in the hospital since arrival.  -She will be discharging with 30 days cardiac monitor.  She has plans to go to [place].  Will review with monitor tech to co ordinate this, and patient has follow up with PCP next week.       Hypertension  Hyperlipidemia  She is on losartan 50 mg daily and Lipitor 5 mg daily  -Continue losartan, Lipitor increased to 10 mg daily     Hypothyroidism  -Resumed levothyroxine     Essential tremors  -Well-controlled at this time, does not take any medication for that.    Consultations This Hospital Stay   SPEECH LANGUAGE PATH ADULT IP CONSULT  SMOKING CESSATION PROGRAM IP CONSULT  NEUROLOGY IP STROKE CONSULT  PHYSICAL THERAPY ADULT IP CONSULT  OCCUPATIONAL THERAPY ADULT IP CONSULT  SPEECH LANGUAGE PATH ADULT IP CONSULT    Code Status   Full Code    Time Spent on this Encounter   I, Raul Barger MD, personally saw the patient today and spent greater than 30 minutes discharging this patient.       Raul Barger MD  [facility] NEUROSCIENCE UNIT  [address] [place] [address]  Phone: [phone]  ______________________________________________________________________    Physical Exam   Vital Signs: Temp: 97.4  F (36.3  C) Temp src: Oral BP: (!) 146/62 Pulse: 54   Resp: 16 SpO2: 96 % O2 Device: None (Room air)    Weight: 170 lbs 13.7 oz    General: AAOx3, appears comfortable.  HEENT: PERRLA EOMI. Mucosa moist.   Lungs: Bilateral equal air entry. Clear to auscultation, normal work of breathing.   CVS: S1S2 regular, no tachycardia or murmur.   Abdomen: Soft, NT, ND. BS heard.  MSK: No edema or deformities.  Neuro: AAOX3.  Patient has subtle right eyelid droop, she reports this is  chronic and unchanged. Otherwise rest of CN 2-12 normal. Strength symmetrical.  Skin: No rash.          Primary Care Physician   Physician No Ref-Primary    Discharge Orders      Follow-Up with Cardiology HANK      Reason for your hospital stay    TIA     Follow-up and recommended labs and tests     Follow up with primary care provider,as scheduled next week     Activity    Your activity upon discharge: activity as tolerated     Cardiac Event Monitor Adult Pediatric     Diet    Follow this diet upon discharge: Orders Placed This Encounter      Combination Diet Low Saturated Fat Diet       Significant Results and Procedures   Most Recent 3 CBC's:  Recent Labs   Lab Test 12/16/23  1203   WBC 5.2   HGB 13.2   MCV 95   *     Most Recent 3 BMP's:  Recent Labs   Lab Test 12/17/23  0631 12/17/23  0016 12/16/23 2043 12/16/23  1633 12/16/23  1203   NA  --   --   --   --  140   POTASSIUM  --   --   --   --  4.1   CHLORIDE  --   --   --   --  105   CO2  --   --   --   --  28   BUN  --   --   --   --  23.6*   CR  --   --   --   --  0.91   ANIONGAP  --   --   --   --  7   POLLY  --   --   --   --  9.1   GLC 90 82 95   < > 94    < > = values in this interval not displayed.     Most Recent 2 LFT's:  Recent Labs   Lab Test 12/16/23  1203   AST 24   ALT 19   ALKPHOS 79   BILITOTAL 0.5     Most Recent 3 Troponin's:No lab results found.  7-Day Micro Results       No results found for the last 168 hours.          Most Recent TSH and T4:No lab results found.  Most Recent 6 glucoses:  Recent Labs   Lab Test 12/17/23  0631 12/17/23  0016 12/16/23 2043 12/16/23  1633 12/16/23  1203   GLC 90 82 95 79 94     Most Recent Urinalysis:No lab results found.,   Results for orders placed or performed during the hospital encounter of 12/16/23   MR Brain w/o & w Contrast    Narrative    EXAM: MRA NECK (CAROTIDS) W/O and W CONTRAST, MR BRAIN W/O and W CONTRAST, MRA BRAIN ([place]) W/O CONTRAST  LOCATION: [facility]  DATE: 12/16/2023    INDICATION: 15 minutes of right lower facial droop and slurred speech, now resolved.  COMPARISON: None.  CONTRAST: 10  TECHNIQUE:   1) Routine multiplanar multisequence head MRI without and with intravenous contrast.  2) 3D time-of-flight head MRA without intravenous contrast.  3) Neck MRA without and with IV contrast. Stenosis measurements made according to NASCET criteria unless otherwise specified.    FINDINGS:  HEAD MRI:  INTRACRANIAL CONTENTS: No acute or subacute infarct. No mass, acute hemorrhage, or extra-axial fluid collections.   Multiple scattered nonspecific T2/FLAIR hyperintensities within the cerebral white matter most consistent with mild chronic microvascular ischemic change. Mild to moderate generalized cerebral atrophy. No hydrocephalus. Normal position of the cerebellar   tonsils. No pathologic contrast enhancement.    SELLA: No abnormality accounting for technique.    OSSEOUS STRUCTURES/SOFT TISSUES: Normal marrow signal. The major intracranial vascular flow voids are maintained.     ORBITS: No abnormality accounting for technique.     SINUSES/MASTOIDS: No paranasal sinus mucosal disease. No middle ear or mastoid effusion.     HEAD MRA:   ANTERIOR CIRCULATION: No stenosis/occlusion, aneurysm, or high flow vascular malformation. A small infundibulum is present along the inferior margin of the right M1 segment at the origin of the small vessel extending along the anterior right temporal   lobe. Standard [place] anatomy.    POSTERIOR CIRCULATION: No stenosis/occlusion, aneurysm, or high flow vascular malformation. Balanced vertebral arteries supply a normal basilar artery.     NECK MRA:   RIGHT CAROTID: No measurable stenosis or dissection.    LEFT CAROTID: No measurable stenosis or dissection.    VERTEBRAL ARTERIES: No focal stenosis or dissection. Balanced vertebral arteries.    AORTIC ARCH: Classic aortic arch anatomy with no significant stenosis at the  origin of the great vessels.      Impression    IMPRESSION:  HEAD MRI:   1.  No acute or subacute ischemic change.  2.  No acute intracranial process.  3.  Generalized brain atrophy and presumed microvascular ischemic changes as detailed above.    HEAD MRA:   1.  No aneurysm, high flow AVM or significant stenosis identified.    NECK MRA:  1.  No hemodynamically significant stenosis in the vessels of the neck.   MRA Brain ([place]) wo Contrast    Narrative    EXAM: MRA NECK (CAROTIDS) W/O and W CONTRAST, MR BRAIN W/O and W CONTRAST, MRA BRAIN ([place]) W/O CONTRAST  LOCATION: [facility]  DATE: 12/16/2023    INDICATION: 15 minutes of right lower facial droop and slurred speech, now resolved.  COMPARISON: None.  CONTRAST: 10  TECHNIQUE:   1) Routine multiplanar multisequence head MRI without and with intravenous contrast.  2) 3D time-of-flight head MRA without intravenous contrast.  3) Neck MRA without and with IV contrast. Stenosis measurements made according to NASCET criteria unless otherwise specified.    FINDINGS:  HEAD MRI:  INTRACRANIAL CONTENTS: No acute or subacute infarct. No mass, acute hemorrhage, or extra-axial fluid collections.   Multiple scattered nonspecific T2/FLAIR hyperintensities within the cerebral white matter most consistent with mild chronic microvascular ischemic change. Mild to moderate generalized cerebral atrophy. No hydrocephalus. Normal position of the cerebellar   tonsils. No pathologic contrast enhancement.    SELLA: No abnormality accounting for technique.    OSSEOUS STRUCTURES/SOFT TISSUES: Normal marrow signal. The major intracranial vascular flow voids are maintained.     ORBITS: No abnormality accounting for technique.     SINUSES/MASTOIDS: No paranasal sinus mucosal disease. No middle ear or mastoid effusion.     HEAD MRA:   ANTERIOR CIRCULATION: No stenosis/occlusion, aneurysm, or high flow vascular malformation. A small infundibulum is  present along the inferior margin of the right M1 segment at the origin of the small vessel extending along the anterior right temporal   lobe. Standard [place] anatomy.    POSTERIOR CIRCULATION: No stenosis/occlusion, aneurysm, or high flow vascular malformation. Balanced vertebral arteries supply a normal basilar artery.     NECK MRA:   RIGHT CAROTID: No measurable stenosis or dissection.    LEFT CAROTID: No measurable stenosis or dissection.    VERTEBRAL ARTERIES: No focal stenosis or dissection. Balanced vertebral arteries.    AORTIC ARCH: Classic aortic arch anatomy with no significant stenosis at the origin of the great vessels.      Impression    IMPRESSION:  HEAD MRI:   1.  No acute or subacute ischemic change.  2.  No acute intracranial process.  3.  Generalized brain atrophy and presumed microvascular ischemic changes as detailed above.    HEAD MRA:   1.  No aneurysm, high flow AVM or significant stenosis identified.    NECK MRA:  1.  No hemodynamically significant stenosis in the vessels of the neck.   MRA Neck (Carotids) wo & w Contrast    Narrative    EXAM: MRA NECK (CAROTIDS) W/O and W CONTRAST, MR BRAIN W/O and W CONTRAST, MRA BRAIN ([place]) W/O CONTRAST  LOCATION: [facility]  DATE: 12/16/2023    INDICATION: 15 minutes of right lower facial droop and slurred speech, now resolved.  COMPARISON: None.  CONTRAST: 10  TECHNIQUE:   1) Routine multiplanar multisequence head MRI without and with intravenous contrast.  2) 3D time-of-flight head MRA without intravenous contrast.  3) Neck MRA without and with IV contrast. Stenosis measurements made according to NASCET criteria unless otherwise specified.    FINDINGS:  HEAD MRI:  INTRACRANIAL CONTENTS: No acute or subacute infarct. No mass, acute hemorrhage, or extra-axial fluid collections.   Multiple scattered nonspecific T2/FLAIR hyperintensities within the cerebral white matter most consistent with mild chronic  microvascular ischemic change. Mild to moderate generalized cerebral atrophy. No hydrocephalus. Normal position of the cerebellar   tonsils. No pathologic contrast enhancement.    SELLA: No abnormality accounting for technique.    OSSEOUS STRUCTURES/SOFT TISSUES: Normal marrow signal. The major intracranial vascular flow voids are maintained.     ORBITS: No abnormality accounting for technique.     SINUSES/MASTOIDS: No paranasal sinus mucosal disease. No middle ear or mastoid effusion.     HEAD MRA:   ANTERIOR CIRCULATION: No stenosis/occlusion, aneurysm, or high flow vascular malformation. A small infundibulum is present along the inferior margin of the right M1 segment at the origin of the small vessel extending along the anterior right temporal   lobe. Standard [place] anatomy.    POSTERIOR CIRCULATION: No stenosis/occlusion, aneurysm, or high flow vascular malformation. Balanced vertebral arteries supply a normal basilar artery.     NECK MRA:   RIGHT CAROTID: No measurable stenosis or dissection.    LEFT CAROTID: No measurable stenosis or dissection.    VERTEBRAL ARTERIES: No focal stenosis or dissection. Balanced vertebral arteries.    AORTIC ARCH: Classic aortic arch anatomy with no significant stenosis at the origin of the great vessels.      Impression    IMPRESSION:  HEAD MRI:   1.  No acute or subacute ischemic change.  2.  No acute intracranial process.  3.  Generalized brain atrophy and presumed microvascular ischemic changes as detailed above.    HEAD MRA:   1.  No aneurysm, high flow AVM or significant stenosis identified.    NECK MRA:  1.  No hemodynamically significant stenosis in the vessels of the neck.       Discharge Medications   Current Discharge Medication List        START taking these medications    Details   clopidogrel (PLAVIX) 75 MG tablet Take 1 tablet (75 mg) by mouth daily  Qty: 20 tablet, Refills: 0    Associated Diagnoses: TIA (transient ischemic attack)           CONTINUE  these medications which have CHANGED    Details   aspirin 81 MG EC tablet Take 1 tablet (81 mg) by mouth daily  Qty: 30 tablet, Refills: 0    Comments: Future refills by PCP  Physician No Ref-Primary with phone number None.  Associated Diagnoses: TIA (transient ischemic attack)      atorvastatin (LIPITOR) 10 MG tablet Take 1 tablet (10 mg) by mouth every evening    Associated Diagnoses: TIA (transient ischemic attack)           CONTINUE these medications which have NOT CHANGED    Details   levothyroxine (SYNTHROID) 75 MCG tablet Take 75 mcg by mouth daily Brand name only      losartan (COZAAR) 50 MG tablet Take 1 tablet by mouth daily Takes at noon      Omega-3 Fatty Acids (FISH OIL) 1200 MG capsule Take 1,200 mg by mouth daily Takes at noon      Vitamin D3 (VITAMIN D, CHOLECALCIFEROL,) 25 mcg (1000 units) tablet Take 2 tablets by mouth daily Takes at noon      zinc gluconate 50 MG tablet Take 50 mg by mouth daily Takes at noon           Allergies   Allergies   Allergen Reactions    Sulfa Antibiotics Dizziness     Vertigo

## 2023-12-17 NOTE — PROGRESS NOTES
Speech Language Pathology: Orders received. Chart reviewed and discussed with care team.? Speech Language Pathology not indicated due to resolved symptoms and no concerns for swallow/speech reported by patient.? Defer discharge recommendations to care team.? Will complete orders.

## 2023-12-17 NOTE — PROGRESS NOTES
12/17/23 1110   Appointment Info   Signing Clinician's Name / Credentials (PT) Loni Goodson, PT, DPT   Living Environment   People in Home spouse   Current Living Arrangements apartment   Home Accessibility no concerns   Transportation Anticipated car, drives self;family or friend will provide   Living Environment Comments Pt resides in apartment in , elevator access. [employer] in [place], [place], all needs on single level of home.   Self-Care   Usual Activity Tolerance good   Current Activity Tolerance moderate   Equipment Currently Used at Home grab bar, tub/shower;raised toilet seat   Fall history within last six months no   Activity/Exercise/Self-Care Comment pt is ind with ADLs and IADLs at baseline no use of AD, drives at baseline, goes to yoga occasionally.   General Information   Onset of Illness/Injury or Date of Surgery 12/16/23   Referring Physician Jose Reyes MD   Patient/Family Therapy Goals Statement (PT) Planning on going home when able.   Pertinent History of Current Problem (include personal factors and/or comorbidities that impact the POC) Pt is a 78-year-old female with history of hypertension, hyperlipidemia, essential tremor, hypothyroidism, come to the ER with complaint of unable to speak, right facial droop lasted for 10 minutes. Transient ischemic attack work up.   Existing Precautions/Restrictions fall;cardiac   Cognition   Affect/Mental Status (Cognition) WFL   Orientation Status (Cognition) oriented x 4   Follows Commands (Cognition) WFL   Pain Assessment   Patient Currently in Pain No   Integumentary/Edema   Integumentary/Edema no deficits were identifed   Posture    Posture Forward head position   Range of Motion (ROM)   Range of Motion ROM is WFL   ROM Comment Grossly BUE and LE with functional transfers   Strength (Manual Muscle Testing)   Strength (Manual Muscle Testing) Able to perform R SLR;Able to perform L SLR   Left Hip (Manual Muscle Testing)   Hip Flexion -  Left Side (4/5) good, left   Hip ABduction - Left Side (4/5) good, left   Hip ADduction - Left Side (5/5) normal,left   Right Hip (Manual Muscle Testing)   Hip Flexion - Right Side (5/5) normal,right   Hip ABduction - Right Side (5/5) normal,right   Hip ADduction - Right Side (5/5) normal,right   Left Knee (Manual Muscle Testing)   Knee Flexion - Left Side (5/5) normal,left   Knee Extension - Left Side (5/5) normal,left   Right Knee (Manual Muscle Testing)   Knee Flexion - Right Side (5/5) normal,right   Knee Extension - Right Side (5/5) normal,right   Bed Mobility   Comment, (Bed Mobility) supine HOB flat>sitting EOB, ind.   Transfers   Comment, (Transfers) Sit>stand, no AD, SBA   Gait/Stairs (Locomotion)   Distance in Feet (Gait) 5' eval + 300' treatment   Comment, (Gait/Stairs) amb with no AD, SBA, NBOS, increased lateral path deviation   Balance   Balance Comments able to maintain seated balance unsupported. Able to maintain standing balance ind.   Sensory Examination   Sensory Perception patient reports no sensory changes   Clinical Impression   Criteria for Skilled Therapeutic Intervention Yes, treatment indicated   PT Diagnosis (PT) Impaired gait   Influenced by the following impairments decreased activity tolerance, impaired balance, decreased strength   Functional limitations due to impairments impaired functional independence   Clinical Presentation (PT Evaluation Complexity) stable   Clinical Presentation Rationale per MR and clinical judgement   Clinical Decision Making (Complexity) low complexity   Planned Therapy Interventions (PT) balance training;bed mobility training;gait training;home exercise program;neuromuscular re-education;ROM (range of motion);stair training;strengthening;stretching;patient/family education;transfer training;progressive activity/exercise   Risk & Benefits of therapy have been explained evaluation/treatment results reviewed;care plan/treatment goals reviewed;risks/benefits  reviewed;current/potential barriers reviewed;participants voiced agreement with care plan;participants included;patient   PT Total Evaluation Time   PT Eval, Low Complexity Minutes (54341) 10   Physical Therapy Goals   PT Frequency One time eval and treatment only   PT Predicted Duration/Target Date for Goal Attainment 12/17/23   PT Goals Bed Mobility;Transfers;Gait;Stairs   PT: Bed Mobility Independent;Supine to/from sit;Goal Met   PT: Transfers Independent;Sit to/from stand;Bed to/from chair;Goal Met   PT: Gait Independent;Greater than 200 feet;Goal Met   PT: Stairs Modified independent;8 stairs;Rail on left;Goal Met   Interventions   Interventions Quick Adds Gait Training;Therapeutic Activity;Therapeutic Procedure   Therapeutic Procedure/Exercise   Ther. Procedure: strength, endurance, ROM, flexibillity Minutes (92500) 10   Treatment Detail/Skilled Intervention Edu and provided pt with handout of HEP via PTRx. Demo for pt the following exercises. Sit to stands, standing single leg hip abduction, single leg hip extension, bridges. Pt verbalized understanding of HEP. Edu pt on OP PT for higher level balance and strength training. Pt verbalized understanding.   Therapeutic Activity   Therapeutic Activities: dynamic activities to improve functional performance Minutes (36123) 1   Treatment Detail/Skilled Intervention pt agreed to therapy session. Pt completed additional STS during session, ind. pt left in chair, all needs in reach, pt tolerated well.   Gait Training   Gait Training Minutes (57706) 10   Symptoms Noted During/After Treatment (Gait Training) fatigue   Treatment Detail/Skilled Intervention pt amb in room and hallway, SBA progressing to ind. Pt completed set of 4 stairs x2, reciprocal pattern, SBA progressing to [place]. Pt completed the following dual task for dynamic balance challenge. Vertical and horizontal head turns, amb backwards, turn and pivot. Pt with increased lateral path deviations with head  turns, decreased chata. Pt tolerated well.   Distance in Feet 300'   [place] Level (Gait Training) stand-by assist   Physical Assistance Level (Gait Training) set-up required;supervision;verbal cues;nonverbal cues (demo/gestures)   Stair Railings present on left side   Physical Assist/Nonphysical Assist (Stairs) set-up required;supervision;verbal cues;nonverbal cues (demo/gestures);1 person assist   Level of [place] (Stairs) stand-by assist   PT Discharge Planning   PT Plan DC   PT Discharge Recommendation (DC Rec) home;home with outpatient physical therapy   PT Rationale for DC Rec Pt tolerated session well. Pt appears to be near baseline functional independence. Anticipate when medically ready, pt may discharge to home with assist as needed, recommend pt follow up with OP PT for higher level balance and strength challenges.   PT Brief overview of current status ind with amb.   Total Session Time   Timed Code Treatment Minutes 21   Total Session Time (sum of timed and untimed services) 31

## 2023-12-17 NOTE — UTILIZATION REVIEW
"  Admission Status; Secondary Review Determination         Under the authority of the Utilization Management Committee, the utilization review process indicated a secondary review on the above patient.  The review outcome is based on review of the medical records, discussions with staff, and applying clinical experience noted on the date of the review.          (x) Observation Status Appropriate - This patient does not meet hospital inpatient criteria and is placed in observation status. If this patient's primary payer is Medicare and was admitted as an inpatient, Condition Code 44 should be used and patient status changed to \"observation\".     RATIONALE FOR DETERMINATION     78-year-old female with history of hypertension, hyperlipidemia, essential tremor, hypothyroidism, come to the ER with complaint of inability to speak and right facial droop that lasted for 10 minutes. MRI was clean, and she will be discharging today on aspirin and plavix. Given TIA would change to observation status.        The severity of illness, intensity of service provided, expected LOS and risk for adverse outcome make the care appropriate for further observation; however, doesn't meet criteria for hospital inpatient admission. Dr Barger notified of this determination.    This document was produced using voice recognition software.      The information on this document is developed by the utilization review team in order for the business office to ensure compliance.  This only denotes the appropriateness of proper admission status and does not reflect the quality of care rendered.         The definitions of Inpatient Status and Observation Status used in making the determination above are those provided in the CMS Coverage Manual, Chapter 1 and Chapter 6, section 70.4.      Sincerely,     Nathan Barger DO   Physician Advisor  Utilization Management  [facility].    "

## 2023-12-17 NOTE — PLAN OF CARE
Physical Therapy Discharge Summary    Reason for therapy discharge:    Discharged to home with outpatient therapy.  All goals and outcomes met, no further needs identified.    Progress towards therapy goal(s). See goals on Care Plan in [facility] electronic health record for goal details.  Goals met    Therapy recommendation(s):    Continued therapy is recommended.  Rationale/Recommendations:   Continue home exercise program. Pt tolerated session well. Pt appears to be near baseline functional independence. Anticipate when medically ready, pt may discharge to home with assist as needed, recommend pt follow up with OP PT for higher level balance and strength challenges.  PT Brief overview of current status: ind with amb.

## 2023-12-17 NOTE — PLAN OF CARE
OT order received.  Per discussion with evaluating PT, no skilled OT needs identified.  Patient is independent with mobility/transfers, and has family to assist as needed.  Will complete orders.

## 2023-12-17 NOTE — PLAN OF CARE
Neuro: Intact. Pt has a baseline right eye droop  Cardio: NSR BP WDL  Resp: LS clear RA  GI: Tolerating diet without issue  : continent  Skin: Intact  Pain: Denies  Activity: SBA

## 2023-12-17 NOTE — PLAN OF CARE
Reason for Admission: Suspected TIA, speech change     Cognitive/Mentation: A/Ox 4  Neuros/CMS: Intact   VS: Stable on RA.   Tele: Sinus Bradycardia.  GI: Last BM 12/16. Continent.  : Voiding adequately.  Continent.  Pulmonary: LS clear.  Pain: Denies.     Drains/Lines: NA  Skin: Intact  Activity: Independent.  Diet: Regular with thin liquids. Takes pills whole.     Therapies recs: Home  Discharge: This afternoon    Aggression Stoplight Tool: Green    End of shift summary: Patient ambulating independently, denies lightheadedness/dizziness.  Neuros intact.  Echo completed.  Discharge to home with cardiac monitor.  Reviewed discharge instructions and medications; no further questions indicated.

## 2023-12-17 NOTE — PLAN OF CARE
Reason for Admission: TIA    Cognitive/Mentation: Alert and oriented x4  Neuros/CMS: Intact ex baseline R eye droop   VS: Stable on RA  Tele: SR/Sinus srinivas   GI: Continent   : Continent   Pulmonary: LS clear  Pain: Denies    Drains/Lines: PIV infusing with NS + KCI 20 mEq/L   Skin: Intact   Activity: SBA  Diet: Combination diet, low saturated fat diet     Therapies recs: Home  Discharge: possibly today after ECHO    Aggression Spotlight Tool: Green    End of shift summary: Pt stable, no acute neuro changes and slept well between cares.

## 2023-12-17 NOTE — PROGRESS NOTES
"[facility]    Stroke Progress Note    Interval Events  No acute overnight events.    HPI Summary  Belinda Castillo is a 78 year old female with h/o of HTN, HLD presents to the ED after she had an episode of inability to speak and right facial droop lasting for about 10 minutes.She states that she had this episode around 10 am on 12/16/23 and denied having another symptoms such as weakness, numbness, double vision, imbalance.     TIA Evaluation Summarized    MRI and/or Head CT No acute or subacute ischemic change     Intracranial Vasculature No aneurysm, high flow AVM or significant stenosis identified.    Cervical Vasculature  No hemodynamically significant stenosis in the vessels of the neck      Echocardiogram No IAS.visual ejection fraction is 60-65%.    EKG/Telemetry Sinus bradycardia with occasional Premature ventricular complexes    Other Testing Not Applicable      LDL 12/16/2023: 79 mg/dL   A1C 12/16/2023: 5.8 %       ABCD2 Patients Score   Age ? 60 years 1 point 1   Blood Pressure    SBP ? 140 or DBP ?  90     1 point 1   Clinical Features    - Unilateral weakness    - Speech disturbance w/o weakness    - Other    2 points  1 point     0 points 1   Duration of symptoms    ? 60 minutes    10-59 minutes    < 10 minutes    2 points  1 point  0 points 1   Diabetes  1 point 0   Patient s ABCD2 Score (0-7) = 4       Impression   Transient ischemic attack    Plan  - ASA 81 mg and Plavix 75 mg daily for 21 days and then ASA 81 mg only   - Neurochecks and Vital Signs every q4h   - Statin: Lipitor 10 mg daily  - 30 day cardiac monitor on discharge    The Stroke Staff is Dr. Avilez.    Natasha Dobbs MD  Vascular Neurology Fellow    To page me or covering stroke neurology team member, click here: AMCOM  Choose \"On Call\" tab at top, then select \"NEUROLOGY/ALL SITES\" from middle drop-down box, press Enter, then look for \"stroke\" or \"telestroke\" for your " "site.  ______________________________________________________    Clinically Significant Risk Factors Present on Admission                # Drug Induced Platelet Defect: home medication list includes an antiplatelet medication   # Hypertension: Noted on problem list      # Overweight: Estimated body mass index is 25.98 kg/m  as calculated from the following:    Height as of this encounter: 1.727 m (5' 8\").    Weight as of this encounter: 77.5 kg (170 lb 13.7 oz).                 Medications   Scheduled Meds   aspirin  81 mg Oral Daily    atorvastatin  40 mg Oral or NG Tube Daily at 8 pm    clopidogrel  75 mg Oral Daily    levothyroxine  75 mcg Oral QAM AC       Infusion Meds      PRN Meds  hydrALAZINE, labetalol, ondansetron **OR** ondansetron, prochlorperazine **OR** prochlorperazine **OR** prochlorperazine       PHYSICAL EXAMINATION  Temp:  [97  F (36.1  C)-98.1  F (36.7  C)] 97.4  F (36.3  C)  Pulse:  [52-97] 54  Resp:  [16-18] 16  BP: (108-158)/() 146/62  SpO2:  [96 %-100 %] 96 %      Neurologic  Mental Status:  alert, oriented x 3, follows commands, speech clear and fluent, naming and repetition normal  Cranial Nerves:  visual fields intact, PERRL, EOMI with normal smooth pursuit, facial sensation intact and symmetric, facial movements symmetric, hearing not formally tested but intact to conversation, palate elevation symmetric and uvula midline, no dysarthria, shoulder shrug strong bilaterally, tongue protrusion midline  Motor:  normal muscle tone and bulk, no abnormal movements, able to move all limbs spontaneously, strength 5/5 throughout upper and lower extremities, no pronator drift  Reflexes:  toes down-going  Sensory:  light touch sensation intact and symmetric throughout upper and lower extremities, no extinction on double simultaneous stimulation   Coordination:  normal finger-to-nose and heel-to-shin bilaterally without dysmetria, rapid alternating movements symmetric  Station/Gait:  " deferred    Stroke Scales    NIHSS  1a. Level of Consciousness 0-->Alert, keenly responsive   1b. LOC Questions 0-->Answers both questions correctly   1c. LOC Commands 0-->Performs both tasks correctly   2.   Best Gaze 0-->Normal   3.   Visual 0-->No visual loss   4.   Facial Palsy 0-->Normal symmetrical movements   5a. Motor Arm, Left 0-->No drift, limb holds 90 (or 45) degrees for full 10 secs   5b. Motor Arm, Right 0-->No drift, limb holds 90 (or 45) degrees for full 10 secs   6a. Motor Leg, Left 0-->No drift, leg holds 30 degree position for full 5 secs   6b. Motor Leg, right 0-->No drift, leg holds 30 degree position for full 5 secs   7.   Limb Ataxia 0-->Absent   8.   Sensory 0-->Normal, no sensory loss   9.   Best Language 0-->No aphasia, normal   10. Dysarthria 0-->Normal   11. Extinction and Inattention  0-->No abnormality   Total 0 (12/17/23 1156)           Imaging  I personally reviewed all imaging; relevant findings per HPI.     Lab Results Data   CBC  Recent Labs   Lab 12/16/23  1203   WBC 5.2   RBC 4.15   HGB 13.2   HCT 39.5   *     Basic Metabolic Panel    Recent Labs   Lab 12/17/23  0631 12/17/23  0016 12/16/23  2043 12/16/23  1633 12/16/23  1203   NA  --   --   --   --  140   POTASSIUM  --   --   --   --  4.1   CHLORIDE  --   --   --   --  105   CO2  --   --   --   --  28   BUN  --   --   --   --  23.6*   CR  --   --   --   --  0.91   GLC 90 82 95   < > 94   POLLY  --   --   --   --  9.1    < > = values in this interval not displayed.     Liver Panel  Recent Labs   Lab 12/16/23  1203   PROTTOTAL 6.9   ALBUMIN 3.9   BILITOTAL 0.5   ALKPHOS 79   AST 24   ALT 19     INR    Recent Labs   Lab Test 12/16/23  1203   INR 1.03      Lipid Profile    Recent Labs   Lab Test 12/16/23  1203   CHOL 157   HDL 67   LDL 79   TRIG 56     A1C    Recent Labs   Lab Test 12/16/23  1203   A1C 5.8*     Troponin    Recent Labs   Lab 12/16/23  1203   CTROPT 12          Data

## 2023-12-20 ENCOUNTER — TELEPHONE (OUTPATIENT)
Dept: CARDIOLOGY | Facility: CLINIC | Age: 78
End: 2023-12-20
Payer: MEDICARE

## 2023-12-20 NOTE — TELEPHONE ENCOUNTER
"Urgent report received from [employer] for \"New Onset Atrial Fibrillation\"    Patient was discharged from [facility] on 12/17/23 with an event monitor after she presented to the ED on 12/16 with stroke-like symptoms and dx with a TIA.    Patient was not seen by a cardiologist while hospitalized and does not follow with a cardiologist outpatient. Patient was seen by Dr. Dobbs for neuro while inpatient. Will send to [facility] RN neuro team for assistance with where to send this information ASA.    Also called [phone] who requested urgent report be faxed to [facility] for further follow up.    *Episode strip was confirmed as true AF by JENNY MALDONADO - strip signed and faxed to neuro    GG RN              "

## 2023-12-21 NOTE — TELEPHONE ENCOUNTER
Both home and work number listed were not in service, called serafin, updated phone numbers. Serafin was able to put patient on the phone, confirmed telephone visit for 9 am on 12/22/23 to discuss cardiac monitor results.     MICA RONDON CMA

## 2023-12-22 ENCOUNTER — VIRTUAL VISIT (OUTPATIENT)
Dept: NEUROLOGY | Facility: CLINIC | Age: 78
End: 2023-12-22
Payer: MEDICARE

## 2023-12-22 VITALS
BODY MASS INDEX: 25.76 KG/M2 | WEIGHT: 170 LBS | DIASTOLIC BLOOD PRESSURE: 82 MMHG | SYSTOLIC BLOOD PRESSURE: 131 MMHG | HEIGHT: 68 IN

## 2023-12-22 DIAGNOSIS — G45.9 TIA (TRANSIENT ISCHEMIC ATTACK): ICD-10-CM

## 2023-12-22 PROCEDURE — 99214 OFFICE O/P EST MOD 30 MIN: CPT | Mod: 95 | Performed by: PHYSICIAN ASSISTANT

## 2023-12-22 RX ORDER — ATORVASTATIN CALCIUM 20 MG/1
20 TABLET, FILM COATED ORAL EVERY EVENING
Qty: 30 TABLET | Refills: 1 | Status: CANCELLED | OUTPATIENT
Start: 2023-12-22

## 2023-12-22 NOTE — NURSING NOTE
Is the patient currently in the state of [place]? YES    Visit mode:TELEPHONE    If the visit is dropped, the patient can be reconnected by: TELEPHONE VISIT: Phone number: [phone]    Will anyone else be joining the visit? NO  (If patient encounters technical issues they should call [phone] :[phone])    How would you like to obtain your AVS? MyChart    Are changes needed to the allergy or medication list? No    Reason for visit: Transient Ischemic Attack    Kina Stout MA

## 2023-12-22 NOTE — PROGRESS NOTES
Virtual Visit Details    Type of service:  Telephone Visit   Phone call duration: 20 minutes     _____________________________________________________________    [facility] [place]         [phone]  _____________________________________________________________      Chief Complaint: TIA follow-up    History of Present Illness: Belinda Castillo is a 78 year old female with pertinent past medical history of HTN, HLD.  On PTA ASA 81 mg three times weekly and Lipitor 10 mg daily.    Was seen at [facility] on 12/16/2023 due to a transient episode of inability to speak and right facial droop x 10 minutes.  MRI/MRA unremarkable. Work-up as described below. Started on DAPT with ASA and Plavix x 21 days and discharged with 30-day heart monitor.    Stroke team was contacted due to urgent heart monitor results that showed A-fib, confirmed by EP cardiologist.  Patient was contacted for prompt follow-up with our team today.      Further review appears that patient had followed up with PCP 12/19/2023 and an office had felt palpitations was found to have atrial fibrillation versus atrial flutter on ECG.  Aspirin and Plavix were stopped and she was started on Eliquis 5 mg twice daily.  She followed up with [facility] cardiologist, Dr. Marlo Mancia, on 12/20/2023.  Her co-pay for Eliquis was $450 for 1 fill.    She is seen today for TIA follow-up.  She describes that intermittently for years she has had some palpitations but usually resolve right away.  The episode while in office on the 19th had lasted a bit longer than she is used to.  She reports that this morning she woke up with room spinning dizziness and associated nausea.  No vision changes or other associated symptoms.  She reports that symptoms are currently still present although somewhat improved.  This provider reviewed [facility]ST stroke symptoms and recommendation with any of those symptoms to  call 911 and present to the emergency department for further evaluation which includes room spinning vertigo.  Patient reports that she has had this happen intermittently in the past and was told that it is due to crystals in her ear canal.  Usually does not occur with nausea.  She felt this morning as though she was about to vomit.  She says she has had reactions to sulfa medications in the past that was similar so feels it is a side effect of starting Eliquis yesterday.  This provider encouraged her to reach out to cardiology/PCP to consider medication switch however at this time unable to rule out that her symptoms are not due to a new stroke so would recommend further evaluation.      We did also review vascular risk factors.  Her blood pressure yesterday was 131/82.  Today 147/64.  Reviewed recommendation for blood pressure goal to be less than 130/80, will follow-up with PCP/cardiology for titration of medications.  Also noted that LDL 79 just slightly above goal 40-70, options to implement Mediterranean diet lifestyle modification or consider increasing Lipitor to 20 mg daily to reach goal.  Patient like to avoid increasing medication doses at this time so continue ongoing follow-up with PCP for further titration to goal.  Quit smoking 60 years ago, has prediabetes.  Occasionally has a glass of wine.  Denies any fevers, night sweats, or unintentional weight loss.     TIA evaluation summarized:  MRI/Head CT: MRI brain: Negative for acute pathology, generalized brain atrophy and presumed chronic vessel disease  Head vessels: MRA head: Unremarkable  Neck vessels: MRA neck: Unremarkable  Echo: TTE: LV normal, EF 60-65%, no WMA, RV normal, normal bilateral atria size, bubble study negative, mild mitral annular calcification, trace MR  EKG/Tele: Sinus bradycardia, LAD, occasional PVCs  LDL: 2023: 79 mg/dL  A1c: 2023: 5.8%  Troponin: 12  Other testin-day heart monitor:*Episode strip was confirmed as  true AF by JENNY MALDONADO - strip signed and faxed to neuro, formal results of heart monitor pending    ABCD2 Patients Score   Age ? 60 years 1 point 1   Blood Pressure    SBP ? 140 or DBP ?  90     1 point 1   Clinical Features    - Unilateral weakness    - Speech disturbance w/o weakness    - Other    2 points  1 point     0 points 1   Duration of symptoms    ? 60 minutes    10-59 minutes    < 10 minutes    2 points  1 point  0 points 1   Diabetes  1 point 0   Patient s ABCD2 Score (0-7) = 4             Modified Soy Scale  Score: 1-No significant disability despite symptoms; able to carry out all usual duties and activities    Impression:   Problem List Items Addressed This Visit       TIA (transient ischemic attack)      78 F transient expressive aphasia and right facial droop, suspect 2/2 TIA, possibly cardioembolic due to new finding of atrial fibrillation prior not on anticoagulation [id number] Score: 6    12/22/23 new onset of spinning vertigo and nausea, patient feels due to Eliquis since started yesterday, unable to rule out acute stroke, recommended further evaluation     Plan:   Medications:  -Continue Eliquis 5 mg twice daily or other anticoagulant per PCP/cardiology if any adverse drug reactions  -if any future providers request that you hold or stop taking anticoagulation then please reach out to our stroke team to be included in the discussion.  -No need for additional aspirin/Plavix  -Okay to continue Lipitor 10 mg daily and attempt to implement Mediterranean diet lifestyle modification to reach LDL goal 40-70    Follow-up:  -Ongoing follow-up with PCP for management of vascular risk factors: BP goal <130/80, prediabetes A1c 5.8%, follow-up with PCP for prevention of diabetes, long-term A1c goal <7% for stroke prevention, LDL goal 40-70  (<40 increases risk of intracranial hemorrhage)  -Follow-up again with our stroke team as needed  -Follow-up with cardiology regarding atrial  "fibrillation/anticoagulation    - Call 911 with any new stroke symptoms    B - Balance problems  E - Eyes (vision loss)  F - Facial weakness or droop  A - Arm weakness  S - Speech/language  T - Time is brain!      Stroke Education provided.  She will call us with any questions.  For any acute neurologic deficits she was advised to  go directly to the hospital rather than call the clinic.    Renee Guzman PA-C  Neurology  12/22/2023 9:48 AM  To page me or covering stroke neurology team member, click here: AMCOM  Choose \"On Call\" tab at top, then search dropdown box for \"Neurology Adult\" & press Enter, look for Neuro ICU/Stroke    ___________________________________________________________________    Current Medications  Current Outpatient Medications   Medication    apixaban ANTICOAGULANT (ELIQUIS) 5 MG tablet    atorvastatin (LIPITOR) 10 MG tablet    levothyroxine (SYNTHROID) 75 MCG tablet    losartan (COZAAR) 50 MG tablet    Omega-3 Fatty Acids (FISH OIL) 1200 MG capsule    Vitamin D3 (VITAMIN D, CHOLECALCIFEROL,) 25 mcg (1000 units) tablet    zinc gluconate 50 MG tablet     No current facility-administered medications for this visit.       Past Medical History  Past Medical History:   Diagnosis Date    ALLERGIC RHINITIS     Allergy, unspecified not elsewhere classified     SEPTRA    CYSTITIS 04/01/1991    Essential hypertension 11/01/2007    Formatting of this note might be different from the original. Diagnosed 2010: amlodipine 5mg. 7/2013: BP controlled still with 2.5mg.    Essential tremor 09/17/2015    Formatting of this note might be different from the original. Chronic. Patient reports her Mother has this also. Declines Neurologic evaluation.    HYPOTHYROIDISM 10/18/1994    Hypothyroidism (acquired) 04/30/2007    Formatting of this note might be different from the original. Most recent TSH normal (9/2015). Diagnosed with Graves Disease around 1998.  Previously Endocrine (not sure which doctor). Initially " hyperthyroidism, treated medically, then hypothyroidism.    OTITIS MEDIA     RIGHT    Palpitations     Pure hypercholesterolemia 2013    Formatting of this note might be different from the original. 2015: FLP normal. Considering statin versus [facility] due to FHx. 2014: Total 198, TG 61, HDL 66, . Considering statin (FHx); patient declined.    Sterilization     Thyroid nodule 2014    Formatting of this note might be different from the original. Noted on MRI/MRA 2014; ultrasound confirmed 1.4cm nodule.  Patient preferred to biopsy (instead of seeing Endocrinology first to review.) Biopsy with benign colloid nodule with cystic degeneration.  Curbsided Endocrine: If FNA fine, TSH with ultrasound in one year; if stable; then 2 years, then Q3-5 years. 2015: ultrasound stable. 2       Social History  Social History     Tobacco Use    Smoking status: Former     Types: Cigarettes     Quit date: 1963     Years since quittin.0    Smokeless tobacco: Never   Vaping Use    Vaping Use: Never used       Physical Exam         No data to display                        Neurologic Exam:  Phone-only visit. Speech was clear and fluent    Neuroimaging: as per HPI. I personally reviewed those images    Labs:    Coagulation studies:  Recent Labs   Lab Test 23  1203   INR 1.03        Lipid panel:  Recent Labs   Lab Test 23  1203   CHOL 157   HDL 67   LDL 79   TRIG 56       HbA1C:  Recent Labs   Lab Test 23  1203   A1C 5.8*       Troponin:  No lab results found.        Billing:    I spent a total of 40 minutes on the day of the visit.   Time spent by me doing chart review, history and exam, documentation and further activities per the note      *All or a portion of this note was generated using voice recognition software and may contain transcription errors.

## 2023-12-22 NOTE — PATIENT INSTRUCTIONS
Medications:  -Continue Eliquis 5 mg twice daily or other anticoagulant per PCP/cardiology if any adverse drug reactions  -No need for additional aspirin/Plavix  -Okay to continue Lipitor 10 mg daily and attempt to implement Mediterranean diet lifestyle modification to reach LDL goal 40-70    Anticoagulation (Eliquis) precautions:  -Contact provider immediately with any signs of bleeding or black/tarry stools  -if any future providers request that you hold or stop taking this medication then please reach out to our stroke team to be included in the discussion.    Follow-up:  -Ongoing follow-up with PCP for management of vascular risk factors: BP goal <130/80, prediabetes A1c 5.8%, follow-up with PCP for prevention of diabetes, long-term A1c goal <7% for stroke prevention, LDL goal 40-70  (<40 increases risk of intracranial hemorrhage)  -Follow-up again with our stroke team as needed  -Follow-up with cardiology regarding atrial fibrillation/anticoagulation    Daily activities:  -home blood pressure monitoring twice daily AM and PM, keep log and bring to medical visits  -moderate aerobic exercise at least 30 minutes/day x 5 days/week    Precautions:  - Call our stroke clinic with any questions or concerns at [phone], or send a [facility] medical advice message  - Call 911 with any new stroke symptoms    B - Balance problems  E - Eyes (vision loss)  F - Facial weakness or droop  A - Arm weakness  S - Speech/language  T - Time is brain!

## 2023-12-22 NOTE — LETTER
12/22/2023         RE: Belinda Castillo  [address]  [place] [address]        Dear Colleague,    Thank you for referring your patient, Belinda Castillo, to the [facility] NEUROLOGY CL[facility] [place]. Please see a copy of my visit note below.    Virtual Visit Details    Type of service:  Telephone Visit   Phone call duration: 20 minutes     _____________________________________________________________    [facility]         [phone]  _____________________________________________________________      Chief Complaint: TIA follow-up    History of Present Illness: Belinda Castillo is a 78 year old female with pertinent past medical history of HTN, HLD.  On PTA ASA 81 mg three times weekly and Lipitor 10 mg daily.    Was seen at [facility] on 12/16/2023 due to a transient episode of inability to speak and right facial droop x 10 minutes.  MRI/MRA unremarkable. Work-up as described below. Started on DAPT with ASA and Plavix x 21 days and discharged with 30-day heart monitor.    Stroke team was contacted due to urgent heart monitor results that showed A-fib, confirmed by EP cardiologist.  Patient was contacted for prompt follow-up with our team today.      Further review appears that patient had followed up with PCP 12/19/2023 and an office had felt palpitations was found to have atrial fibrillation versus atrial flutter on ECG.  Aspirin and Plavix were stopped and she was started on Eliquis 5 mg twice daily.  She followed up with [facility] cardiologist, Dr. Marlo Mancia, on 12/20/2023.  Her co-pay for Eliquis was $450 for 1 fill.    She is seen today for TIA follow-up.  She describes that intermittently for years she has had some palpitations but usually resolve right away.  The episode while in office on the 19th had lasted a bit longer than she is used to.  She reports that this morning she woke up  with room spinning dizziness and associated nausea.  No vision changes or other associated symptoms.  She reports that symptoms are currently still present although somewhat improved.  This provider reviewed BE FAST stroke symptoms and recommendation with any of those symptoms to call 911 and present to the emergency department for further evaluation which includes room spinning vertigo.  Patient reports that she has had this happen intermittently in the past and was told that it is due to crystals in her ear canal.  Usually does not occur with nausea.  She felt this morning as though she was about to vomit.  She says she has had reactions to sulfa medications in the past that was similar so feels it is a side effect of starting Eliquis yesterday.  This provider encouraged her to reach out to cardiology/PCP to consider medication switch however at this time unable to rule out that her symptoms are not due to a new stroke so would recommend further evaluation.      We did also review vascular risk factors.  Her blood pressure yesterday was 131/82.  Today 147/64.  Reviewed recommendation for blood pressure goal to be less than 130/80, will follow-up with PCP/cardiology for titration of medications.  Also noted that LDL 79 just slightly above goal 40-70, options to implement Mediterranean diet lifestyle modification or consider increasing Lipitor to 20 mg daily to reach goal.  Patient like to avoid increasing medication doses at this time so continue ongoing follow-up with PCP for further titration to goal.  Quit smoking 60 years ago, has prediabetes.  Occasionally has a glass of wine.  Denies any fevers, night sweats, or unintentional weight loss.     TIA evaluation summarized:  MRI/Head CT: MRI brain: Negative for acute pathology, generalized brain atrophy and presumed chronic vessel disease  Head vessels: MRA head: Unremarkable  Neck vessels: MRA neck: Unremarkable  Echo: TTE: LV normal, EF 60-65%, no WMA, RV  normal, normal bilateral atria size, bubble study negative, mild mitral annular calcification, trace MR  EKG/Tele: Sinus bradycardia, LAD, occasional PVCs  LDL: 2023: 79 mg/dL  A1c: 2023: 5.8%  Troponin: 12  Other testin-day heart monitor:*Episode strip was confirmed as true AF by EP MD - strip signed and faxed to neuro, formal results of heart monitor pending    ABCD2 Patients Score   Age = 58 years 1 point 1   Blood Pressure    SBP = 140 or DBP =  90     1 point 1   Clinical Features    - Unilateral weakness    - Speech disturbance w/o weakness    - Other    2 points  1 point     0 points 1   Duration of symptoms    = 60 minutes    10-59 minutes    < 10 minutes    2 points  1 point  0 points 1   Diabetes  1 point 0   Patient s ABCD2 Score (0-7) = 4             Modified Soy Scale  Score: 1-No significant disability despite symptoms; able to carry out all usual duties and activities    Impression:   Problem List Items Addressed This Visit       TIA (transient ischemic attack)      78 F transient expressive aphasia and right facial droop, suspect 2/2 TIA, possibly cardioembolic due to new finding of atrial fibrillation prior not on anticoagulation [id number] Score: 6    23 new onset of spinning vertigo and nausea, patient feels due to Eliquis since started yesterday, unable to rule out acute stroke, recommended further evaluation     Plan:   Medications:  -Continue Eliquis 5 mg twice daily or other anticoagulant per PCP/cardiology if any adverse drug reactions  -if any future providers request that you hold or stop taking anticoagulation then please reach out to our stroke team to be included in the discussion.  -No need for additional aspirin/Plavix  -Okay to continue Lipitor 10 mg daily and attempt to implement Mediterranean diet lifestyle modification to reach LDL goal 40-70    Follow-up:  -Ongoing follow-up with PCP for management of vascular risk factors: BP goal <130/80, prediabetes  "A1c 5.8%, follow-up with PCP for prevention of diabetes, long-term A1c goal <7% for stroke prevention, LDL goal 40-70  (<40 increases risk of intracranial hemorrhage)  -Follow-up again with our stroke team as needed  -Follow-up with cardiology regarding atrial fibrillation/anticoagulation    - Call 911 with any new stroke symptoms    B - Balance problems  E - Eyes (vision loss)  F - Facial weakness or droop  A - Arm weakness  S - Speech/language  T - Time is brain!      Stroke Education provided.  She will call us with any questions.  For any acute neurologic deficits she was advised to  go directly to the hospital rather than call the clinic.    Renee Guzman PA-C  Neurology  12/22/2023 9:48 AM  To page me or covering stroke neurology team member, click here: [facility]COM  Choose \"On Call\" tab at top, then search dropdown box for \"Neurology Adult\" & press Enter, look for Neuro ICU/Stroke    ___________________________________________________________________    Current Medications  Current Outpatient Medications   Medication     apixaban ANTICOAGULANT (ELIQUIS) 5 MG tablet     atorvastatin (LIPITOR) 10 MG tablet     levothyroxine (SYNTHROID) 75 MCG tablet     losartan (COZAAR) 50 MG tablet     Omega-3 Fatty Acids (FISH OIL) 1200 MG capsule     Vitamin D3 (VITAMIN D, CHOLECALCIFEROL,) 25 mcg (1000 units) tablet     zinc gluconate 50 MG tablet     No current facility-administered medications for this visit.       Past Medical History  Past Medical History:   Diagnosis Date     ALLERGIC RHINITIS      Allergy, unspecified not elsewhere classified     SEPTRA     CYSTITIS 04/01/1991     Essential hypertension 11/01/2007    Formatting of this note might be different from the original. Diagnosed 2010: amlodipine 5mg. 7/2013: BP controlled still with 2.5mg.     Essential tremor 09/17/2015    Formatting of this note might be different from the original. Chronic. Patient reports her Mother has this also. Declines Neurologic " evaluation.     HYPOTHYROIDISM 10/18/1994     Hypothyroidism (acquired) 2007    Formatting of this note might be different from the original. Most recent TSH normal (2015). Diagnosed with Graves Disease around .  Previously Endocrine (not sure which doctor). Initially hyperthyroidism, treated medically, then hypothyroidism.     OTITIS MEDIA     RIGHT     Palpitations      Pure hypercholesterolemia 2013    Formatting of this note might be different from the original. 2015: FLP normal. Considering statin versus [facility] due to FHx. 2014: Total 198, TG 61, HDL 66, . Considering statin (FHx); patient declined.     Sterilization      Thyroid nodule 2014    Formatting of this note might be different from the original. Noted on MRI/MRA 2014; ultrasound confirmed 1.4cm nodule.  Patient preferred to biopsy (instead of seeing Endocrinology first to review.) Biopsy with benign colloid nodule with cystic degeneration.  Curbsided Endocrine: If FNA fine, TSH with ultrasound in one year; if stable; then 2 years, then Q3-5 years. 2015: ultrasound stable. 2       Social History  Social History     Tobacco Use     Smoking status: Former     Types: Cigarettes     Quit date: 1963     Years since quittin.0     Smokeless tobacco: Never   Vaping Use     Vaping Use: Never used       Physical Exam         No data to display                        Neurologic Exam:  Phone-only visit. Speech was clear and fluent    Neuroimaging: as per HPI. I personally reviewed those images    Labs:    Coagulation studies:  Recent Labs   Lab Test 23  1203   INR 1.03        Lipid panel:  Recent Labs   Lab Test 23  1203   CHOL 157   HDL 67   LDL 79   TRIG 56       HbA1C:  Recent Labs   Lab Test 23  1203   A1C 5.8*       Troponin:  No lab results found.        Billing:    I spent a total of 40 minutes on the day of the visit.   Time spent by me doing chart review, history and exam,  documentation and further activities per the note      *All or a portion of this note was generated using voice recognition software and may contain transcription errors.       Again, thank you for allowing me to participate in the care of your patient.        Sincerely,        Renee Guzman PA-C